# Patient Record
Sex: MALE | Race: WHITE | NOT HISPANIC OR LATINO | Employment: UNEMPLOYED | URBAN - NONMETROPOLITAN AREA
[De-identification: names, ages, dates, MRNs, and addresses within clinical notes are randomized per-mention and may not be internally consistent; named-entity substitution may affect disease eponyms.]

---

## 2017-01-09 DIAGNOSIS — J98.01 ACUTE BRONCHOSPASM: Primary | ICD-10-CM

## 2017-01-11 RX ORDER — ALBUTEROL SULFATE 90 UG/1
AEROSOL, METERED RESPIRATORY (INHALATION)
Qty: 18 G | Refills: 3 | Status: SHIPPED | OUTPATIENT
Start: 2017-01-11 | End: 2019-06-22

## 2017-01-17 ENCOUNTER — TELEPHONE (OUTPATIENT)
Dept: SURGERY | Facility: OTHER | Age: 42
End: 2017-01-17

## 2017-01-17 NOTE — TELEPHONE ENCOUNTER
Called and left voicemail for patient to return call to clinic to reschedule his appointment this morning due to Dr. Blanc being in the OR.     Zoe Aponte

## 2017-04-11 ENCOUNTER — TELEPHONE (OUTPATIENT)
Dept: FAMILY MEDICINE | Facility: OTHER | Age: 42
End: 2017-04-11

## 2017-04-11 NOTE — TELEPHONE ENCOUNTER
9:29 AM    Reason for Call: Phone Call    Description: Brijesh had a scope scheduled but they cancelled it. Can another appointment be scheduled ?  Please call Nena    Was an appointment offered for this call?  No    Preferred method for responding to this message: [phone]  Nena    If we cannot reach you directly, may we leave a detailed response at the number you provided?  Yes        Katlyn Tuttle

## 2017-04-11 NOTE — TELEPHONE ENCOUNTER
Called LM about reschedule appt/  available with Dr Blanc [facility] Monday 5/22/17 am or pm    Kim Harley

## 2017-04-17 DIAGNOSIS — K08.89 PAIN, DENTAL: ICD-10-CM

## 2017-04-19 RX ORDER — IBUPROFEN 800 MG/1
TABLET, FILM COATED ORAL
Qty: 90 TABLET | Refills: 5 | Status: SHIPPED | OUTPATIENT
Start: 2017-04-19 | End: 2023-05-02

## 2017-04-19 NOTE — TELEPHONE ENCOUNTER
Ibuprofen   Last Written Prescription Date: 12/13/16  Last Quantity: 90, # refills: 1  Last Office Visit with [facility], [facility] or [facility] prescribing provider: 11/28/16       Creatinine   Date Value Ref Range Status   12/22/2016 0.72 0.66 - 1.25 mg/dL Final     Lab Results   Component Value Date    AST 18 12/22/2016     Lab Results   Component Value Date    ALT 17 12/22/2016     BP Readings from Last 3 Encounters:   12/22/16 128/85   11/28/16 116/76   11/14/16 118/70

## 2018-02-01 ENCOUNTER — APPOINTMENT (OUTPATIENT)
Dept: OCCUPATIONAL MEDICINE | Facility: OTHER | Age: 43
End: 2018-02-01

## 2018-02-01 ENCOUNTER — APPOINTMENT (OUTPATIENT)
Dept: CHIROPRACTIC MEDICINE | Facility: OTHER | Age: 43
End: 2018-02-01

## 2018-02-01 PROCEDURE — 99499 UNLISTED E&M SERVICE: CPT

## 2018-02-18 ENCOUNTER — DOCUMENTATION ONLY (OUTPATIENT)
Dept: FAMILY MEDICINE | Facility: OTHER | Age: 43
End: 2018-02-18

## 2018-02-18 RX ORDER — ONDANSETRON 4 MG/1
1 TABLET, ORALLY DISINTEGRATING ORAL EVERY 6 HOURS PRN
COMMUNITY
Start: 2016-03-08 | End: 2021-08-17

## 2018-02-18 RX ORDER — HYDROCODONE BITARTRATE AND ACETAMINOPHEN 5; 325 MG/1; MG/1
1 TABLET ORAL EVERY 4 HOURS PRN
COMMUNITY
Start: 2016-03-07 | End: 2019-06-22

## 2018-02-18 RX ORDER — IBUPROFEN 200 MG
3 TABLET ORAL 4 TIMES DAILY PRN
COMMUNITY
Start: 2015-05-01 | End: 2021-08-17

## 2018-02-19 ENCOUNTER — DOCUMENTATION ONLY (OUTPATIENT)
Dept: FAMILY MEDICINE | Facility: OTHER | Age: 43
End: 2018-02-19

## 2018-02-19 RX ORDER — LISINOPRIL/HYDROCHLOROTHIAZIDE 10-12.5 MG
1 TABLET ORAL DAILY
COMMUNITY
Start: 2013-07-25 | End: 2019-06-22

## 2019-02-14 DIAGNOSIS — R11.0 NAUSEA: ICD-10-CM

## 2019-02-14 RX ORDER — ONDANSETRON 4 MG/1
4 TABLET, FILM COATED ORAL EVERY 8 HOURS PRN
Qty: 12 TABLET | Refills: 0 | OUTPATIENT
Start: 2019-02-14

## 2019-02-14 NOTE — TELEPHONE ENCOUNTER
Spoke with pharmacy and received new telephone number as a contact for this patient. Pharmacy was updated on patient needing to be seen prior to refill authorization. Writer then called number and left message for patient top return eriberto to clinic; notify patient of need to schedule an appointment upon return call.  Thank you.  Caterina Kaplan LPN

## 2019-02-14 NOTE — TELEPHONE ENCOUNTER
ondansetron (ZOFRAN) 4 MG tablet  Last Written Prescription Date:  12/13/16  Last Fill Quantity: 12,   # refills: 0  Last Office Visit: 12/28/16  Future Office visit:

## 2019-02-14 NOTE — TELEPHONE ENCOUNTER
Attempted to contact patient and person receiving call stated incorrect number.  Caterina Kaplan LPN

## 2019-03-25 ENCOUNTER — APPOINTMENT (OUTPATIENT)
Dept: OCCUPATIONAL MEDICINE | Facility: OTHER | Age: 44
End: 2019-03-25

## 2019-03-25 PROCEDURE — 99000 SPECIMEN HANDLING OFFICE-LAB: CPT

## 2019-06-22 ENCOUNTER — HOSPITAL ENCOUNTER (EMERGENCY)
Facility: HOSPITAL | Age: 44
Discharge: HOME | End: 2019-06-22
Attending: PHYSICIAN ASSISTANT | Admitting: PHYSICIAN ASSISTANT

## 2019-06-22 VITALS
SYSTOLIC BLOOD PRESSURE: 150 MMHG | TEMPERATURE: 97 F | RESPIRATION RATE: 14 BRPM | DIASTOLIC BLOOD PRESSURE: 99 MMHG | OXYGEN SATURATION: 99 %

## 2019-06-22 DIAGNOSIS — L03.90 CELLULITIS: ICD-10-CM

## 2019-06-22 PROCEDURE — 87070 CULTURE OTHR SPECIMN AEROBIC: CPT | Performed by: PHYSICIAN ASSISTANT

## 2019-06-22 PROCEDURE — G0463 HOSPITAL OUTPT CLINIC VISIT: HCPCS

## 2019-06-22 PROCEDURE — 99213 OFFICE O/P EST LOW 20 MIN: CPT | Mod: Z6 | Performed by: PHYSICIAN ASSISTANT

## 2019-06-22 PROCEDURE — 87205 SMEAR GRAM STAIN: CPT | Performed by: PHYSICIAN ASSISTANT

## 2019-06-22 RX ORDER — DOXYCYCLINE 100 MG/1
100 CAPSULE ORAL 2 TIMES DAILY
Qty: 20 CAPSULE | Refills: 0 | Status: SHIPPED | OUTPATIENT
Start: 2019-06-22 | End: 2019-07-02

## 2019-06-22 ASSESSMENT — ENCOUNTER SYMPTOMS
JOINT SWELLING: 0
CHILLS: 0
MYALGIAS: 0
ARTHRALGIAS: 0
FEVER: 0
WOUND: 1

## 2019-06-22 NOTE — ED PROVIDER NOTES
"  History     Chief Complaint   Patient presents with     Cellulitis     rt leg cellulitis, notes thinks he had exposure to poison destin     HPI  Brijesh Flynn is a 44 year old male who presents to urgent care today with complaints of right leg pain and rash.  He states onset about 1 week ago.  Patient tells me that he was mowing lawn and doing yard work, and noted a small area of rash and \"lump\" on the posterior right knee that evening.  Since that time, he has had increased redness and extending rash, with some associated tenderness, purulent drainage, and warmth.  He denies any fever, chills, sweats, joint pain, myalgias, fatigue or other concerns.  He did not notice any tick bites.  He has been applying topical Benadryl cream without relief.    Allergies:  Allergies   Allergen Reactions     Keflex [Cephalexin Hcl]        Problem List:    Patient Active Problem List    Diagnosis Date Noted     ACP (advance care planning) 11/28/2016     Priority: Medium     Advance Care Planning 11/28/2016: ACP Review of Chart / Resources Provided:  Reviewed chart for advance care plan.  Brijesh Flynn has no plan or code status on file. Discussed available resources and provided with information. Confirmed code status reflects current choices pending further ACP discussions.  Confirmed/documented legally designated decision makers.  Added by Trindiad Celeste             Essential hypertension 06/20/2013     Priority: Medium     Overview:   IMO Update          Past Medical History:    No past medical history on file.    Past Surgical History:    No past surgical history on file.    Family History:    Family History   Family history unknown: Yes       Social History:  Marital Status:  Single [1]  Social History     Tobacco Use     Smoking status: Current Every Day Smoker     Packs/day: 1.00     Smokeless tobacco: Never Used   Substance Use Topics     Alcohol use: No     Alcohol/week: 0.0 oz     Drug use: Not on file    "     Medications:      doxycycline hyclate (VIBRAMYCIN) 100 MG capsule   ibuprofen (ADVIL/MOTRIN) 200 MG tablet   ibuprofen (ADVIL/MOTRIN) 800 MG tablet   IBUPROFEN PO   ondansetron (ZOFRAN) 4 MG tablet   ondansetron (ZOFRAN-ODT) 4 MG ODT tab         Review of Systems   Constitutional: Negative for chills and fever.   Musculoskeletal: Negative for arthralgias, joint swelling and myalgias.   Skin: Positive for rash (Extending redness, posterior right thigh) and wound (Posterior right knee).       Physical Exam   BP: 150/99  Heart Rate: 95  Temp: 97  F (36.1  C)  Resp: 14  SpO2: 99 %      Physical Exam   Constitutional: He appears well-developed and well-nourished. No distress.   Pulmonary/Chest: Effort normal.   Skin:   Right lower extremity with significant erythema, induration and warmth noted on the posterior aspect of the upper thigh and popliteal fossa.  There is a open sore, that appears linear, with overlying crusting and clear drainage.  No fluctuance or purulent drainage.  Negative Homans sign.   Psychiatric: He has a normal mood and affect. His behavior is normal.       ED Course        Procedures              Critical Care time:               No results found for this or any previous visit (from the past 24 hour(s)).    Medications - No data to display    Assessments & Plan (with Medical Decision Making)   44-year-old male who presented to urgent care today with complaints of right lower extremity rash for 1 week that has been worsening.  He was unaware of any exposures to ticks; however, onset of symptoms was following yard work.  Physical examination revealed right lower extremity erythema, warmth and open sore on the posterior aspect of the upper thigh and popliteal fossa with some clear drainage and overlying crust.  There was no drainable abscess.  Culture was obtained and is pending.  Patient will be placed on doxycycline 100 mg twice daily for 10 days for cellulitis and to cover for tickborne  illness.  Education was given on side effects of medication including GI upset and sun sensitivity.  He was advised to follow-up with his primary provider in 1 week for wound recheck, and to extend antibiotics if needed.  Signs and symptoms of worsening were reviewed with patient including when to return.  He verbalized understanding and is in agreement with plan.      I have reviewed the nursing notes.    I have reviewed the findings, diagnosis, plan and need for follow up with the patient.         Medication List      Started    doxycycline hyclate 100 MG capsule  Commonly known as:  VIBRAMYCIN  100 mg, Oral, 2 TIMES DAILY            Final diagnoses:   Cellulitis       6/22/2019   [facility]     Aminta Mike PA-C  06/22/19 [id number]

## 2019-06-22 NOTE — ED TRIAGE NOTES
Pt presents with redness,warmth and pain to his right leg since last Sunday when he was weed whacking in shorts. Wound noted to back of his right knee with purulent drainage.

## 2019-06-22 NOTE — ED AVS SNAPSHOT
[facility]  [address] [address]  Phone:  [phone]                                    Brijesh Flynn   MRN: [id number]    Department:  [facility]   Date of Visit:  6/22/2019           After Visit Summary Signature Page    I have received my discharge instructions, and my questions have been answered. I have discussed any challenges I see with this plan with the nurse or doctor.    ..........................................................................................................................................  Patient/Patient Representative Signature      ..........................................................................................................................................  Patient Representative Print Name and Relationship to Patient    ..................................................               ................................................  Date                                   Time    ..........................................................................................................................................  Reviewed by Signature/Title    ...................................................              ..............................................  Date                                               Time          22EPIC Rev 08/18

## 2019-06-22 NOTE — DISCHARGE INSTRUCTIONS
Take all antibiotics, unless we call you with culture results to change treatment.  Apply topical bacitracin and keep covered with bandaid to prevent further infection until open area is healed.  Ice, Tylenol/Ibuprofen for comfort.  Return with any worsening symptoms or new concerns.  Follow up in clinic with your provider in 1 week for recheck.

## 2019-06-23 LAB
GRAM STN SPEC: ABNORMAL
SPECIMEN SOURCE: ABNORMAL

## 2019-06-24 LAB
BACTERIA SPEC CULT: NORMAL
SPECIMEN SOURCE: NORMAL

## 2021-08-17 ENCOUNTER — APPOINTMENT (OUTPATIENT)
Dept: ULTRASOUND IMAGING | Facility: HOSPITAL | Age: 46
End: 2021-08-17
Attending: NURSE PRACTITIONER

## 2021-08-17 ENCOUNTER — HOSPITAL ENCOUNTER (EMERGENCY)
Facility: HOSPITAL | Age: 46
Discharge: HOME | End: 2021-08-17
Attending: STUDENT IN AN ORGANIZED HEALTH CARE EDUCATION/TRAINING PROGRAM | Admitting: STUDENT IN AN ORGANIZED HEALTH CARE EDUCATION/TRAINING PROGRAM

## 2021-08-17 VITALS
SYSTOLIC BLOOD PRESSURE: 159 MMHG | OXYGEN SATURATION: 98 % | DIASTOLIC BLOOD PRESSURE: 107 MMHG | TEMPERATURE: 98 F | RESPIRATION RATE: 16 BRPM | HEART RATE: 72 BPM

## 2021-08-17 DIAGNOSIS — L03.115 CELLULITIS OF RIGHT LOWER EXTREMITY: ICD-10-CM

## 2021-08-17 LAB
HOLD SPECIMEN: NORMAL

## 2021-08-17 PROCEDURE — 99284 EMERGENCY DEPT VISIT MOD MDM: CPT | Mod: 25

## 2021-08-17 PROCEDURE — 99284 EMERGENCY DEPT VISIT MOD MDM: CPT | Performed by: STUDENT IN AN ORGANIZED HEALTH CARE EDUCATION/TRAINING PROGRAM

## 2021-08-17 PROCEDURE — 93971 EXTREMITY STUDY: CPT | Mod: RT

## 2021-08-17 RX ORDER — CLINDAMYCIN HCL 150 MG
300 CAPSULE ORAL 4 TIMES DAILY
Qty: 80 CAPSULE | Refills: 0 | Status: SHIPPED | OUTPATIENT
Start: 2021-08-17 | End: 2021-08-27

## 2021-08-17 NOTE — ED NOTES
Pt states he has had bilateral leg swelling on and off for the last year. States last week he was cutting wood and a log hit him on his right leg below the knee proxomal side. Pt has bruise in that area. States right leg swelling worse since then. C/O some tightness feeling. No pain on dorsa flexion of right foot.

## 2021-08-18 NOTE — ED NOTES
Ultrasound in progress. Pt anxious and reports he is ready to leave. Agreeable to having the ultrasound at this time.

## 2021-08-18 NOTE — DISCHARGE INSTRUCTIONS
Please take the clindamycin that I have prescribed for you 4 times a day for the next 10 days.  Follow-up with your primary care provider within the next week.  Call to schedule an appointment.  Return to the emergency department if you have worsening of your symptoms or any new concerning symptoms.  For pain you can use ibuprofen and Tylenol going forward.  Most people see improvement in their symptoms within 24 to 48 hours, if you do not see improvement in your symptoms within 48 to 72 hours you can return to the emergency department for reevaluation as you may be needing a different antibiotic.

## 2021-08-18 NOTE — ED PROVIDER NOTES
History     Chief Complaint   Patient presents with     Leg Swelling     HPI  Brijesh Flynn is a 46 year old male with RLE swelling and redness with pain and heat for the last week. He states that he's had swelling on and off in the RLE for the last year. He thinks it started this week after a wood chip struck his leg.   Denies fevers, numbness, tingling, weakness, nausea, vomiting, cother complaints. Denies previous hx of clots.    Allergies:  Allergies   Allergen Reactions     Keflex [Cephalexin Hcl]        Problem List:    Patient Active Problem List    Diagnosis Date Noted     ACP (advance care planning) 11/28/2016     Priority: Medium     Advance Care Planning 11/28/2016: ACP Review of Chart / Resources Provided:  Reviewed chart for advance care plan.  Brijesh Flynn has no plan or code status on file. Discussed available resources and provided with information. Confirmed code status reflects current choices pending further ACP discussions.  Confirmed/documented legally designated decision makers.  Added by Trinidad Celeste             Essential hypertension 06/20/2013     Priority: Medium     Overview:   IMO Update          Past Medical History:    No past medical history on file.    Past Surgical History:    No past surgical history on file.    Family History:    Family History   Family history unknown: Yes       Social History:  Marital Status:  Single [1]  Social History     Tobacco Use     Smoking status: Current Every Day Smoker     Packs/day: 1.00     Smokeless tobacco: Never Used   Substance Use Topics     Alcohol use: No     Alcohol/week: 0.0 standard drinks     Drug use: Not on file        Medications:    clindamycin (CLEOCIN) 150 MG capsule  ibuprofen (ADVIL/MOTRIN) 800 MG tablet          Review of Systems  A complete review of systems was performed and is otherwise negative.     Physical Exam   BP: 132/91  Pulse: 87  Temp: 98.7  F (37.1  C)  Resp: 16  SpO2: 98 %      Physical  Exam  Constitutional: Alert and conversant. NAD   HENT: NCAT   Eyes: Normal pupils   Neck: supple   CV: Normal rate, regular rhythm, no murmur   Pulmonary/Chest: Non-labored respirations, clear to auscultation bilaterally   Abdominal: Soft, non-tender, non-distended   MSK: MTZ. Redness, swelling and heat in RLE, no obvious areas of induration, too much sweling to palpate pulses  Neuro: Alert and appropriate   Skin: Warm and dry. No diaphoresis. No rashes on exposed skin    Psych: Appropriate mood and affect     ED Course     ED Course as of Aug 17 2105   Tue Aug 17, 2021   2036 46-year-old male male with no relevant past medical history here with swelling, redness in the right lower extremity.  Primary differential concerning for cellulitis versus DVT.  There is substantial swelling, and he notes that the swelling existed prior to the redness and warmth.  We will plan for right lower extremity DVT ultrasound to rule out clot.  Plan for antibiotics after if negative.      2101 No DVT, will discharge with antibiotics, primary care follow-up in a week.   US Lower Extremity Venous Duplex Right   2104 Patient updated on the findings, plan for discharge      2105 Patient discharged in stable condition with all questions answered return precautions given, primary care follow-up within 1 week.        Procedures         Results for orders placed or performed during the hospital encounter of 08/17/21 (from the past 24 hour(s))   [place] Draw    Narrative    The following orders were created for panel order [place] Draw.  Procedure                               Abnormality         Status                     ---------                               -----------         ------                     Extra Blue Top Tube[[id number]]                              Final result               Extra Red Top Tube[[id number]]                               Final result               Extra Green Top (Lithium...[[id number]]                       Final result               Extra Green Top (Lithium...[[id number]]                      Final result               Extra Purple Top Tube[[id number]]                            Final result                 Please view results for these tests on the individual orders.   Extra Blue Top Tube   Result Value Ref Range    Hold Specimen JIC    Extra Red Top Tube   Result Value Ref Range    Hold Specimen JIC    Extra Green Top (Lithium Heparin) Tube   Result Value Ref Range    Hold Specimen JIC    Extra Green Top (Lithium Heparin) Tube   Result Value Ref Range    Hold Specimen JIC    Extra Purple Top Tube   Result Value Ref Range    Hold Specimen JIC    US Lower Extremity Venous Duplex Right    Narrative    Exam:US LOWER EXTREMITY VENOUS DUPLEX RIGHT    History: Right leg pain    Comparisons: None    Technique: Venous duplex ultrasonography of the lower extremity was  performed.     Findings: The common femoral vein, superficial femoral vein and  popliteal vein are fully compressible with spontaneous and augmentable  venous flow.           Impression    Impression: No evidence of deep venous thrombosis within the right  lower extremity.    GWENDOLYN NARANJO MD         SYSTEM ID:  [id number]       Medications - No data to display    Assessments & Plan (with Medical Decision Making)     I have reviewed the nursing notes.    I have reviewed the findings, diagnosis, plan and need for follow up with the patient.    New Prescriptions    CLINDAMYCIN (CLEOCIN) 150 MG CAPSULE    Take 2 capsules (300 mg) by mouth 4 times daily for 10 days       Final diagnoses:   Cellulitis of right lower extremity       8/17/2021   [facility]     Isrrael Mondragon MD  08/17/21 [id number]

## 2022-03-17 ENCOUNTER — APPOINTMENT (OUTPATIENT)
Dept: GENERAL RADIOLOGY | Facility: HOSPITAL | Age: 47
End: 2022-03-17
Attending: EMERGENCY MEDICINE

## 2022-03-17 ENCOUNTER — HOSPITAL ENCOUNTER (EMERGENCY)
Facility: HOSPITAL | Age: 47
Discharge: HOME | End: 2022-03-17
Attending: EMERGENCY MEDICINE | Admitting: EMERGENCY MEDICINE

## 2022-03-17 VITALS
RESPIRATION RATE: 20 BRPM | TEMPERATURE: 97 F | WEIGHT: 165 LBS | SYSTOLIC BLOOD PRESSURE: 144 MMHG | BODY MASS INDEX: 25.01 KG/M2 | HEART RATE: 84 BPM | HEIGHT: 68 IN | OXYGEN SATURATION: 99 % | DIASTOLIC BLOOD PRESSURE: 110 MMHG

## 2022-03-17 DIAGNOSIS — S61.412A LACERATION OF LEFT HAND WITHOUT FOREIGN BODY, INITIAL ENCOUNTER: ICD-10-CM

## 2022-03-17 PROCEDURE — 12042 INTMD RPR N-HF/GENIT2.6-7.5: CPT | Performed by: EMERGENCY MEDICINE

## 2022-03-17 PROCEDURE — 90471 IMMUNIZATION ADMIN: CPT | Performed by: EMERGENCY MEDICINE

## 2022-03-17 PROCEDURE — 99284 EMERGENCY DEPT VISIT MOD MDM: CPT | Mod: 25

## 2022-03-17 PROCEDURE — 250N000011 HC RX IP 250 OP 636: Performed by: EMERGENCY MEDICINE

## 2022-03-17 PROCEDURE — 73130 X-RAY EXAM OF HAND: CPT | Mod: LT

## 2022-03-17 PROCEDURE — 90715 TDAP VACCINE 7 YRS/> IM: CPT | Performed by: EMERGENCY MEDICINE

## 2022-03-17 PROCEDURE — 250N000013 HC RX MED GY IP 250 OP 250 PS 637: Performed by: EMERGENCY MEDICINE

## 2022-03-17 PROCEDURE — 96374 THER/PROPH/DIAG INJ IV PUSH: CPT | Mod: XU

## 2022-03-17 PROCEDURE — 12042 INTMD RPR N-HF/GENIT2.6-7.5: CPT

## 2022-03-17 RX ORDER — OXYCODONE AND ACETAMINOPHEN 5; 325 MG/1; MG/1
1 TABLET ORAL ONCE
Status: COMPLETED | OUTPATIENT
Start: 2022-03-17 | End: 2022-03-17

## 2022-03-17 RX ADMIN — HYDROMORPHONE HYDROCHLORIDE 1 MG: 1 INJECTION, SOLUTION INTRAMUSCULAR; INTRAVENOUS; SUBCUTANEOUS at 02:26

## 2022-03-17 RX ADMIN — OXYCODONE HYDROCHLORIDE AND ACETAMINOPHEN 1 TABLET: 5; 325 TABLET ORAL at 03:42

## 2022-03-17 RX ADMIN — CLOSTRIDIUM TETANI TOXOID ANTIGEN (FORMALDEHYDE INACTIVATED), CORYNEBACTERIUM DIPHTHERIAE TOXOID ANTIGEN (FORMALDEHYDE INACTIVATED), BORDETELLA PERTUSSIS TOXOID ANTIGEN (GLUTARALDEHYDE INACTIVATED), BORDETELLA PERTUSSIS FILAMENTOUS HEMAGGLUTININ ANTIGEN (FORMALDEHYDE INACTIVATED), BORDETELLA PERTUSSIS PERTACTIN ANTIGEN, AND BORDETELLA PERTUSSIS FIMBRIAE 2/3 ANTIGEN 0.5 ML: 5; 2; 2.5; 5; 3; 5 INJECTION, SUSPENSION INTRAMUSCULAR at 02:19

## 2022-03-17 ASSESSMENT — ENCOUNTER SYMPTOMS
FEVER: 0
CHILLS: 0
SHORTNESS OF BREATH: 0

## 2022-03-17 NOTE — ED TRIAGE NOTES
Pt in for evaluation of left hand injury. Reports he was cutting wood with an axe and the tool jumped off the log and hit him on the hand. Unknown tetanus status. Provider in to see pt on arrival to room.

## 2022-03-17 NOTE — ED PROVIDER NOTES
History     Chief Complaint   Patient presents with     Hand Injury     HPI  Brijesh Flynn is a 47 year old male who is here with laceration to left hand after striking it accidentally with an ax.  He was working, went to chop firewood, sustained laceration then.  Unsure when last tetanus was.  Severe pain.  Able to move hand and wrist albeit without I mean with significant pain.  No other injuries.  Has been putting compression on it as it was bleeding a lot, states it was squirting.    Allergies:  Allergies   Allergen Reactions     Keflex [Cephalexin Hcl]        Problem List:    Patient Active Problem List    Diagnosis Date Noted     ACP (advance care planning) 11/28/2016     Priority: Medium     Advance Care Planning 11/28/2016: ACP Review of Chart / Resources Provided:  Reviewed chart for advance care plan.  Brijesh Flynn has no plan or code status on file. Discussed available resources and provided with information. Confirmed code status reflects current choices pending further ACP discussions.  Confirmed/documented legally designated decision makers.  Added by Trindiad Celeste             Essential hypertension 06/20/2013     Priority: Medium     Overview:   IMO Update          Past Medical History:    History reviewed. No pertinent past medical history.    Past Surgical History:    History reviewed. No pertinent surgical history.    Family History:    Family History   Family history unknown: Yes       Social History:  Marital Status:  Single [1]  Social History     Tobacco Use     Smoking status: Current Every Day Smoker     Packs/day: 0.50     Smokeless tobacco: Never Used   Substance Use Topics     Alcohol use: No     Alcohol/week: 0.0 standard drinks     Drug use: Not Currently        Medications:    ibuprofen (ADVIL/MOTRIN) 800 MG tablet          Review of Systems   Constitutional: Negative for chills and fever.   Respiratory: Negative for shortness of breath.    Cardiovascular: Negative for chest  "pain.   All other systems reviewed and are negative.      Physical Exam   BP: (!) 162/107  Pulse: 99  Temp: 97  F (36.1  C)  Resp: 20  Height: 172.7 cm (5' 8\")  Weight: 74.8 kg (165 lb)  SpO2: 96 %      Physical Exam  Constitutional:       General: He is not in acute distress.     Appearance: Normal appearance.   HENT:      Head: Normocephalic and atraumatic.      Right Ear: External ear normal.      Left Ear: External ear normal.      Nose: Nose normal. No rhinorrhea.   Eyes:      Conjunctiva/sclera: Conjunctivae normal.   Cardiovascular:      Rate and Rhythm: Normal rate and regular rhythm.      Pulses: Normal pulses.   Pulmonary:      Effort: Pulmonary effort is normal. No respiratory distress.      Breath sounds: Normal breath sounds.   Abdominal:      General: There is no distension.      Palpations: Abdomen is soft.      Tenderness: There is no abdominal tenderness.   Musculoskeletal:         General: No deformity.      Comments: 4 cm linear laceration to the left hand, posterior surface, between first and second digit, down to muscle no tendon involvement   Skin:     General: Skin is warm and dry.      Capillary Refill: Capillary refill takes less than 2 seconds.   Neurological:      General: No focal deficit present.      Mental Status: He is alert. Mental status is at baseline.   Psychiatric:         Mood and Affect: Mood normal.         Behavior: Behavior normal.         ED Course                 [facility]    -Laceration Repair    Date/Time: 3/17/2022 7:04 AM  Performed by: Cricket Gore MD  Authorized by: Cricket Gore MD     Risks, benefits and alternatives discussed.      ANESTHESIA (see MAR for exact dosages):     Anesthesia method:  Local infiltration    Local anesthetic:  Lidocaine 2% WITH epi  LACERATION DETAILS     Location:  Hand    Hand location:  L hand, dorsum    Length (cm):  4    Depth (mm):  3    REPAIR TYPE:     Repair type:  Simple      EXPLORATION:     Hemostasis achieved " with:  Epinephrine and direct pressure    Wound exploration: wound explored through full range of motion and entire depth of wound probed and visualized      Wound extent: areolar tissue violated      Contaminated: yes      TREATMENT:     Area cleansed with:  Saline    Amount of cleaning:  Extensive    Irrigation solution:  Sterile saline    Irrigation volume:  1000    Irrigation method:  Pressure wash    Visualized foreign bodies/material removed: yes      SKIN REPAIR     Repair method:  Sutures    Suture size:  3-0    Suture material:  Nylon    Suture technique:  Simple interrupted    Number of sutures:  7    APPROXIMATION     Approximation:  Close    POST-PROCEDURE DETAILS     Dressing:  Non-adherent dressing        PROCEDURE    Patient Tolerance:  Patient tolerated the procedure well with no immediate complications               Critical Care time:               No results found for this or any previous visit (from the past 24 hour(s)).    Medications   HYDROmorphone (DILAUDID) injection 1 mg (1 mg Intravenous Given 3/17/22 0226)   Tdap (tetanus-diphtheria-acell pertussis) (ADACEL) injection 0.5 mL (0.5 mLs Intramuscular Given 3/17/22 0219)   oxyCODONE-acetaminophen (PERCOCET) 5-325 MG per tablet 1 tablet (1 tablet Oral Given 3/17/22 0342)       Assessments & Plan (with Medical Decision Making)     I have reviewed the nursing notes.    I have reviewed the findings, diagnosis, plan and need for follow up with the patient.      Discharge Medication List as of 3/17/2022  4:31 AM          Final diagnoses:   Laceration of left hand without foreign body, initial encounter       3/17/2022   [facility]     Cricket Gore MD  03/17/22 [id number]

## 2022-07-30 ENCOUNTER — HOSPITAL ENCOUNTER (EMERGENCY)
Facility: HOSPITAL | Age: 47
Discharge: HOME | End: 2022-07-30
Attending: EMERGENCY MEDICINE | Admitting: EMERGENCY MEDICINE

## 2022-07-30 ENCOUNTER — APPOINTMENT (OUTPATIENT)
Dept: GENERAL RADIOLOGY | Facility: HOSPITAL | Age: 47
End: 2022-07-30
Attending: EMERGENCY MEDICINE

## 2022-07-30 VITALS
RESPIRATION RATE: 16 BRPM | HEART RATE: 79 BPM | HEIGHT: 68 IN | OXYGEN SATURATION: 97 % | BODY MASS INDEX: 25.01 KG/M2 | WEIGHT: 165 LBS | TEMPERATURE: 98.5 F | DIASTOLIC BLOOD PRESSURE: 98 MMHG | SYSTOLIC BLOOD PRESSURE: 148 MMHG

## 2022-07-30 DIAGNOSIS — S51.811A LACERATION OF RIGHT FOREARM, INITIAL ENCOUNTER: ICD-10-CM

## 2022-07-30 PROCEDURE — 96374 THER/PROPH/DIAG INJ IV PUSH: CPT | Mod: XU

## 2022-07-30 PROCEDURE — 12032 INTMD RPR S/A/T/EXT 2.6-7.5: CPT

## 2022-07-30 PROCEDURE — 12032 INTMD RPR S/A/T/EXT 2.6-7.5: CPT | Performed by: EMERGENCY MEDICINE

## 2022-07-30 PROCEDURE — 73090 X-RAY EXAM OF FOREARM: CPT | Mod: RT

## 2022-07-30 PROCEDURE — 250N000011 HC RX IP 250 OP 636: Performed by: EMERGENCY MEDICINE

## 2022-07-30 PROCEDURE — 99284 EMERGENCY DEPT VISIT MOD MDM: CPT | Mod: 25

## 2022-07-30 RX ORDER — FENTANYL CITRATE 50 UG/ML
50 INJECTION, SOLUTION INTRAMUSCULAR; INTRAVENOUS ONCE
Status: COMPLETED | OUTPATIENT
Start: 2022-07-30 | End: 2022-07-30

## 2022-07-30 RX ADMIN — FENTANYL CITRATE 50 MCG: 50 INJECTION, SOLUTION INTRAMUSCULAR; INTRAVENOUS at 16:10

## 2022-07-30 RX ADMIN — FENTANYL CITRATE 50 MCG: 50 INJECTION, SOLUTION INTRAMUSCULAR; INTRAVENOUS at 16:16

## 2022-07-30 NOTE — ED TRIAGE NOTES
Pt arrives via EMS after cut self with an angle  type tool malfunction to RT forearm., pt arrives with tourniquet in place by EMS. tourniquet placed at 1534. Bleeding controlled at this time. Pt is alert, diaphoretic. Pain 8/10.      Triage Assessment     Row Name 07/30/22 7548       Triage Assessment (Adult)    Airway WDL WDL       Respiratory WDL    Respiratory WDL WDL       Skin Circulation/Temperature WDL    Skin Circulation/Temperature WDL X;all    Skin Circulation no pallor    Skin Temperature --  DIAPHORETIC       Cardiac WDL    Cardiac WDL WDL       Peripheral/Neurovascular WDL    Peripheral Neurovascular WDL WDL;neurovascular assessment upper;pulse assessment    Pulse Assessment radial       RUE Neurovascular Assessment    Temperature RUE warm       Pulse Radial    Right Radial Pulse --   tournqet in place

## 2022-07-30 NOTE — LETTER
Brijesh Flynn was seen and treated in our emergency department on 7/30/2022.  He should be on light duty until August 14, 2022.    If you have any questions or concerns, please don't hesitate to call.              Micha Aragon PA-C

## 2022-07-30 NOTE — DISCHARGE INSTRUCTIONS
You were seen in the emergency department at [facility] for a deep laceration of your right forearm.  Your laceration was washed out and repaired here.  There are 15 external stitches which will need to be removed.  We would recommend taking these out in 14 days because there is a lot of tension on the wound.  In the meantime for the next 2 weeks we would advise avoiding any weightbearing more than 5 pounds with the right elbow and forearm.  Please monitor for infection.  If you notice a lot of redness or drainage spreading from the area we should reevaluate you in the ER.

## 2022-07-30 NOTE — ED PROVIDER NOTES
EMERGENCY DEPARTMENT ENCOUNTER      NAME: Brijesh Flynn  AGE: 47 year old male  YOB: 1975  MRN: [id number]  EVALUATION DATE & TIME: 2022  4:04 PM    PCP: No Ref-Primary, Physician    ED PROVIDER: Ricardo Munson M.D.      Chief Complaint   Patient presents with     Laceration     WITH ANGLE          FINAL IMPRESSION:  1. Laceration of right forearm, initial encounter          ED COURSE & MEDICAL DECISION MAKIN year old male presents to the Emergency Department for evaluation of R forearm laceration. Patient seen immediately on arrival. Fairly deep laceration to R dorsal forearm.  Large-bore IV access was obtained.  Patient was started on crystalloid bolus and given IV fentanyl.  Wound was anesthetized and irrigated.  Tourniquet was released with only a mild amount of venous bleeding following this which was easily controllable with pressure.  Laceration was further irrigated and anesthetized and closed with both deep and superficial sutures.  There is no evidence of arterial injury, bony injury, severe tendon disruption.  There is some disruption of the brachioradialis musculature in that region however this appears to be relatively incomplete.  He has normal function of his distal forearm.  We discussed wound care plan.  His tetanus is up-to-date.  He was discharged to follow-up in 14 days for wound check and suture removal.    At the conclusion of the encounter I discussed the results of all of the tests and the disposition. The questions were answered. The patient or family acknowledged understanding and was agreeable with the care plan.       MEDICATIONS GIVEN IN THE EMERGENCY:  Medications   fentaNYL (PF) (SUBLIMAZE) injection 50 mcg (50 mcg Intravenous Given 22 1610)   fentaNYL (PF) (SUBLIMAZE) injection 50 mcg (50 mcg Intravenous Given 22 1616)       NEW PRESCRIPTIONS STARTED AT TODAY'S ER VISIT  New Prescriptions    No medications on file       "    =================================================================    Lists of hospitals in the [place]    Patient information was obtained from: Patient      Brijesh Flynn is a 47 year old male who presents to this ED today for evaluation of laceration. He was working with an angle , plugged it in and it started up unexpectedly, lacerating his R forearm. He had his wife drive him to the fire station. There was brisk bleeding EMS couldn't control with pressure so a tourniquet was applied. He reports pain isolated to the area of the laceration. He denies any weakness of numbness of his distal forearm or hand.      REVIEW OF SYSTEMS   All systems reviewed and negative except as noted in HPI.    PAST MEDICAL HISTORY:  No past medical history on file.    PAST SURGICAL HISTORY:  No past surgical history on file.        CURRENT MEDICATIONS:    No current facility-administered medications for this encounter.     Current Outpatient Medications   Medication     ibuprofen (ADVIL/MOTRIN) 800 MG tablet         ALLERGIES:  Allergies   Allergen Reactions     Keflex [Cephalexin Hcl]        FAMILY HISTORY:  Family History   Family history unknown: Yes       SOCIAL HISTORY:   Social History     Socioeconomic History     Marital status: Single   Tobacco Use     Smoking status: Current Every Day Smoker     Packs/day: 0.50     Smokeless tobacco: Never Used   Substance and Sexual Activity     Alcohol use: No     Alcohol/week: 0.0 standard drinks     Drug use: Not Currently       VITALS:  BP (!) 153/107   Pulse 96   Temp 98.5  F (36.9  C)   Resp 16   Ht 1.727 m (5' 8\")   Wt 74.8 kg (165 lb)   SpO2 99%   BMI 25.09 kg/m      PHYSICAL EXAM    Constitutional: Anxious middle-age male patient, mildly diaphoretic, laying in bed  HENT: Normocephalic, Atraumatic. Neck Supple.  Eyes: EOMI, Conjunctiva normal.  Respiratory: Breathing comfortably on room air. Speaks full sentences easily. Lungs clear to ascultation.  Cardiovascular: Normal heart rate, " Regular rhythm. No peripheral edema.  Abdomen: Soft  Musculoskeletal: Arterial tourniquet is in place on the right upper arm upon arrival.  There is a deep laceration to the dorsal right forearm just distal to the elbow.  There is exposed muscle belly from the brachial radialis and some extensor muscles.  Patient is able to flex and extend at the wrist and  strength is normal.  Once tourniquet is removed there is minimal bleeding.  Integument: Warm, Dry.  Neurologic: Alert & awake, Normal motor function, Normal sensory function, No focal deficits noted.  Sensation and strength are intact throughout distal right arm  Psychiatric: Cooperative. Affect appropriate.       PROCEDURES:   [facility]    -Laceration Repair    Date/Time: 7/30/2022 4:48 PM  Performed by: Ricardo Munson MD  Authorized by: Ricardo Munson MD     Emergent condition/consent implied      ANESTHESIA (see MAR for exact dosages):     Anesthesia method:  Local infiltration    Local anesthetic:  Lidocaine 1% WITH epi  LACERATION DETAILS     Location:  Shoulder/arm    Shoulder/arm location:  R lower arm    Length (cm):  6    Depth (mm):  15    REPAIR TYPE:     Repair type:  Intermediate      EXPLORATION:     Hemostasis achieved with:  Epinephrine and direct pressure    Wound exploration: wound explored through full range of motion and entire depth of wound probed and visualized      Wound extent: muscle damage      Wound extent: no nerve damage, no tendon damage, no underlying fracture and no vascular damage      Contaminated: no      TREATMENT:     Area cleansed with:  Saline    Amount of cleaning:  Standard    Irrigation solution:  Sterile saline    Irrigation method:  Syringe    Visualized foreign bodies/material removed: no      SUBCUTANEOUS REPAIR     Suture size:  3-0    Suture material:  Vicryl    Number of sutures:  5    SKIN REPAIR     Repair method:  Sutures    Suture size:  3-0    Suture material:  Nylon    Suture  technique:  Simple interrupted    Number of sutures:  15    APPROXIMATION     Approximation:  Close    POST-PROCEDURE DETAILS     Dressing:  Non-adherent dressing        PROCEDURE    Patient Tolerance:  Patient tolerated the procedure well with no immediate complications              Ricardo Munson M.D.  Emergency Medicine  [facility]  [address] [place] [id number] [phone]  Dept: [phone]       Ricardo Munson MD  07/30/22 6895

## 2022-08-14 ENCOUNTER — TELEPHONE (OUTPATIENT)
Dept: NURSING | Facility: CLINIC | Age: 47
End: 2022-08-14

## 2022-08-14 NOTE — TELEPHONE ENCOUNTER
Patient's SO, Nena calling (verbal consent to communicate given by patient). Wondering where pt can get a return to work note.    Patient was seen in [place] ED on 7/30 and required stitches in his R arm due to a laceration. He does not have a  PCP. Needs to be able to return back to work tomorrow.    Advised pt that he could make an appt with a [facility] provider to establish care, or he could try the Urgent Care in [place]. Patient's SO verbalized understanding.     Zoë Lopez, RN, BSN  [facility]   Triage Nurse Advisor

## 2023-05-02 ENCOUNTER — OFFICE VISIT (OUTPATIENT)
Dept: FAMILY MEDICINE | Facility: OTHER | Age: 48
End: 2023-05-02
Attending: NURSE PRACTITIONER
Payer: COMMERCIAL

## 2023-05-02 VITALS
OXYGEN SATURATION: 96 % | RESPIRATION RATE: 18 BRPM | HEART RATE: 102 BPM | BODY MASS INDEX: 26.97 KG/M2 | WEIGHT: 177.4 LBS | DIASTOLIC BLOOD PRESSURE: 92 MMHG | SYSTOLIC BLOOD PRESSURE: 148 MMHG | TEMPERATURE: 98.4 F

## 2023-05-02 DIAGNOSIS — Z20.2 EXPOSURE TO SEXUALLY TRANSMITTED DISEASE (STD): Primary | ICD-10-CM

## 2023-05-02 PROCEDURE — 86780 TREPONEMA PALLIDUM: CPT | Performed by: NURSE PRACTITIONER

## 2023-05-02 PROCEDURE — 99213 OFFICE O/P EST LOW 20 MIN: CPT | Performed by: NURSE PRACTITIONER

## 2023-05-02 PROCEDURE — 86803 HEPATITIS C AB TEST: CPT | Performed by: NURSE PRACTITIONER

## 2023-05-02 PROCEDURE — 87491 CHLMYD TRACH DNA AMP PROBE: CPT | Performed by: NURSE PRACTITIONER

## 2023-05-02 PROCEDURE — 36415 COLL VENOUS BLD VENIPUNCTURE: CPT | Performed by: NURSE PRACTITIONER

## 2023-05-02 PROCEDURE — 87591 N.GONORRHOEAE DNA AMP PROB: CPT | Performed by: NURSE PRACTITIONER

## 2023-05-02 PROCEDURE — 87389 HIV-1 AG W/HIV-1&-2 AB AG IA: CPT | Performed by: NURSE PRACTITIONER

## 2023-05-02 PROCEDURE — 86695 HERPES SIMPLEX TYPE 1 TEST: CPT | Performed by: NURSE PRACTITIONER

## 2023-05-02 PROCEDURE — 87661 TRICHOMONAS VAGINALIS AMPLIF: CPT | Performed by: NURSE PRACTITIONER

## 2023-05-02 PROCEDURE — 86696 HERPES SIMPLEX TYPE 2 TEST: CPT | Performed by: NURSE PRACTITIONER

## 2023-05-02 ASSESSMENT — PAIN SCALES - GENERAL: PAINLEVEL: NO PAIN (0)

## 2023-05-02 NOTE — PROGRESS NOTES
Assessment & Plan     Brijesh presents to clinic today for concern for STD/I. His girlfriend of 10 years recently tested positive for Trichomonas and Herpes. He reports that he has never had an STD. He denies any urinary, penile, or scrotal concerns.     (Z20.2) Exposure to sexually transmitted disease (STD)  (primary encounter diagnosis)  Comment: check STD/I labs and urine   Plan: Trichomonas vaginalis by PCR, GC/Chlamydia by         PCR - [facility], HIV Antigen Antibody Combo,         Hepatitis C Screen Reflex to HCV RNA Quant and         Genotype, Herpes Simplex Virus 1 and 2 IgG,         Treponema Abs w Reflex to RPR and Titer               Nicotine/Tobacco Cessation:  He reports that he has been smoking. He has been smoking an average of .5 packs per day. He has never used smokeless tobacco.  Nicotine/Tobacco Cessation Plan:   Information offered: Patient not interested at this time      See Patient Instructions    Return if symptoms worsen or fail to improve.    OMAR Ross CNP  [facility]   Brijesh is a 48 year old, presenting for the following health issues:  STD      HPI     Concern - STD Check   Onset: Unknown as he has no symptoms. Significant other recently tested positive for Trich and Herpes    Description: Trich, Herpes   Reporting no s/sx - expressing exposure       Review of Systems   Constitutional, HEENT, cardiovascular, pulmonary, gi and gu systems are negative, except as otherwise noted.      Objective    BP (!) 148/92   Pulse 102   Temp 98.4  F (36.9  C)   Resp 18   Wt 80.5 kg (177 lb 6.4 oz)   SpO2 96%   BMI 26.97 kg/m    Body mass index is 26.97 kg/m .  Physical Exam   GENERAL: healthy, alert and no distress  NECK: no adenopathy, no asymmetry, masses, or scars and thyroid normal to palpation  RESP: lungs clear to auscultation - no rales, rhonchi or wheezes  CV: regular rate and rhythm, normal S1 S2, no S3 or S4, no murmur, click or rub, no  peripheral edema and peripheral pulses strong   (male): declined exam   MS: no gross musculoskeletal defects noted, no edema  SKIN: no suspicious lesions or rashes  PSYCH: mentation appears normal, affect normal/bright

## 2023-05-03 LAB
C TRACH DNA SPEC QL PROBE+SIG AMP: NEGATIVE
HCV AB SERPL QL IA: NONREACTIVE
HIV 1+2 AB+HIV1 P24 AG SERPL QL IA: NONREACTIVE
N GONORRHOEA DNA SPEC QL NAA+PROBE: NEGATIVE
T VAGINALIS DNA SPEC QL NAA+PROBE: NOT DETECTED

## 2023-05-04 LAB — T PALLIDUM AB SER QL: NONREACTIVE

## 2023-05-08 LAB
HSV1 IGG SERPL QL IA: 21.5 INDEX
HSV1 IGG SERPL QL IA: ABNORMAL
HSV2 IGG SERPL QL IA: 0.13 INDEX
HSV2 IGG SERPL QL IA: ABNORMAL

## 2023-06-03 ENCOUNTER — HEALTH MAINTENANCE LETTER (OUTPATIENT)
Age: 48
End: 2023-06-03

## 2023-06-15 ENCOUNTER — APPOINTMENT (OUTPATIENT)
Dept: OCCUPATIONAL MEDICINE | Facility: OTHER | Age: 48
End: 2023-06-15

## 2023-06-15 PROCEDURE — 99080 SPECIAL REPORTS OR FORMS: CPT

## 2023-06-15 PROCEDURE — 94010 BREATHING CAPACITY TEST: CPT

## 2023-06-15 PROCEDURE — 99199 UNLISTED SPECIAL SVC PX/RPRT: CPT

## 2024-02-12 ENCOUNTER — APPOINTMENT (OUTPATIENT)
Dept: CHIROPRACTIC MEDICINE | Facility: OTHER | Age: 49
End: 2024-02-12

## 2024-02-12 ENCOUNTER — APPOINTMENT (OUTPATIENT)
Dept: OCCUPATIONAL MEDICINE | Facility: OTHER | Age: 49
End: 2024-02-12

## 2024-02-12 PROCEDURE — 99499 UNLISTED E&M SERVICE: CPT

## 2024-06-01 ENCOUNTER — HOSPITAL ENCOUNTER (EMERGENCY)
Facility: HOSPITAL | Age: 49
Discharge: LEFT BEFORE DISCHARGE | End: 2024-06-01
Admitting: FAMILY MEDICINE

## 2024-06-01 VITALS
DIASTOLIC BLOOD PRESSURE: 111 MMHG | OXYGEN SATURATION: 97 % | WEIGHT: 162.59 LBS | TEMPERATURE: 97.3 F | SYSTOLIC BLOOD PRESSURE: 174 MMHG | BODY MASS INDEX: 24.72 KG/M2 | HEART RATE: 67 BPM | RESPIRATION RATE: 18 BRPM

## 2024-06-01 LAB
ALBUMIN SERPL BCG-MCNC: 4 G/DL (ref 3.5–5.2)
ALP SERPL-CCNC: 100 U/L (ref 40–150)
ALT SERPL W P-5'-P-CCNC: 17 U/L (ref 0–70)
ANION GAP SERPL CALCULATED.3IONS-SCNC: 11 MMOL/L (ref 7–15)
AST SERPL W P-5'-P-CCNC: 27 U/L (ref 0–45)
BASOPHILS # BLD AUTO: 0 10E3/UL (ref 0–0.2)
BASOPHILS NFR BLD AUTO: 0 %
BILIRUB SERPL-MCNC: 0.6 MG/DL
BUN SERPL-MCNC: 13.3 MG/DL (ref 6–20)
CALCIUM SERPL-MCNC: 9.2 MG/DL (ref 8.6–10)
CHLORIDE SERPL-SCNC: 101 MMOL/L (ref 98–107)
CREAT SERPL-MCNC: 0.72 MG/DL (ref 0.67–1.17)
DEPRECATED HCO3 PLAS-SCNC: 22 MMOL/L (ref 22–29)
EGFRCR SERPLBLD CKD-EPI 2021: >90 ML/MIN/1.73M2
EOSINOPHIL # BLD AUTO: 0.1 10E3/UL (ref 0–0.7)
EOSINOPHIL NFR BLD AUTO: 1 %
ERYTHROCYTE [DISTWIDTH] IN BLOOD BY AUTOMATED COUNT: 14.3 % (ref 10–15)
GLUCOSE SERPL-MCNC: 101 MG/DL (ref 70–99)
HCT VFR BLD AUTO: 42.8 % (ref 40–53)
HGB BLD-MCNC: 14 G/DL (ref 13.3–17.7)
HOLD SPECIMEN: NORMAL
IMM GRANULOCYTES # BLD: 0.1 10E3/UL
IMM GRANULOCYTES NFR BLD: 0 %
LYMPHOCYTES # BLD AUTO: 1.4 10E3/UL (ref 0.8–5.3)
LYMPHOCYTES NFR BLD AUTO: 12 %
MCH RBC QN AUTO: 27.5 PG (ref 26.5–33)
MCHC RBC AUTO-ENTMCNC: 32.7 G/DL (ref 31.5–36.5)
MCV RBC AUTO: 84 FL (ref 78–100)
MONOCYTES # BLD AUTO: 0.5 10E3/UL (ref 0–1.3)
MONOCYTES NFR BLD AUTO: 4 %
NEUTROPHILS # BLD AUTO: 10.1 10E3/UL (ref 1.6–8.3)
NEUTROPHILS NFR BLD AUTO: 83 %
NRBC # BLD AUTO: 0 10E3/UL
NRBC BLD AUTO-RTO: 0 /100
PLATELET # BLD AUTO: 327 10E3/UL (ref 150–450)
POTASSIUM SERPL-SCNC: 4 MMOL/L (ref 3.4–5.3)
PROT SERPL-MCNC: 7.8 G/DL (ref 6.4–8.3)
RBC # BLD AUTO: 5.09 10E6/UL (ref 4.4–5.9)
SODIUM SERPL-SCNC: 134 MMOL/L (ref 135–145)
WBC # BLD AUTO: 12.2 10E3/UL (ref 4–11)

## 2024-06-01 PROCEDURE — 99281 EMR DPT VST MAYX REQ PHY/QHP: CPT

## 2024-06-01 PROCEDURE — 85004 AUTOMATED DIFF WBC COUNT: CPT | Performed by: FAMILY MEDICINE

## 2024-06-01 PROCEDURE — 99281 EMR DPT VST MAYX REQ PHY/QHP: CPT | Performed by: FAMILY MEDICINE

## 2024-06-01 PROCEDURE — 36415 COLL VENOUS BLD VENIPUNCTURE: CPT | Performed by: FAMILY MEDICINE

## 2024-06-01 PROCEDURE — 80053 COMPREHEN METABOLIC PANEL: CPT | Performed by: FAMILY MEDICINE

## 2024-06-01 ASSESSMENT — COLUMBIA-SUICIDE SEVERITY RATING SCALE - C-SSRS
6. HAVE YOU EVER DONE ANYTHING, STARTED TO DO ANYTHING, OR PREPARED TO DO ANYTHING TO END YOUR LIFE?: NO
1. IN THE PAST MONTH, HAVE YOU WISHED YOU WERE DEAD OR WISHED YOU COULD GO TO SLEEP AND NOT WAKE UP?: NO
2. HAVE YOU ACTUALLY HAD ANY THOUGHTS OF KILLING YOURSELF IN THE PAST MONTH?: NO

## 2024-06-01 NOTE — ED TRIAGE NOTES
Patient presents with complaints of severe lower abdominal pain that he states started yesterday and had gotten worse. He denies any nausea, vomiting, diarrhea. Last bowel movement was yesterday and normal per patient.

## 2024-06-15 ENCOUNTER — APPOINTMENT (OUTPATIENT)
Dept: CT IMAGING | Facility: HOSPITAL | Age: 49
End: 2024-06-15
Attending: INTERNAL MEDICINE

## 2024-06-15 ENCOUNTER — HOSPITAL ENCOUNTER (EMERGENCY)
Facility: HOSPITAL | Age: 49
Discharge: HOME | End: 2024-06-15
Attending: INTERNAL MEDICINE | Admitting: INTERNAL MEDICINE

## 2024-06-15 VITALS
SYSTOLIC BLOOD PRESSURE: 158 MMHG | RESPIRATION RATE: 20 BRPM | OXYGEN SATURATION: 97 % | HEART RATE: 74 BPM | DIASTOLIC BLOOD PRESSURE: 95 MMHG | TEMPERATURE: 97.8 F

## 2024-06-15 DIAGNOSIS — M62.830 LUMBAR PARASPINAL MUSCLE SPASM: ICD-10-CM

## 2024-06-15 DIAGNOSIS — K59.09 OTHER CONSTIPATION: ICD-10-CM

## 2024-06-15 LAB
ALBUMIN SERPL BCG-MCNC: 3.9 G/DL (ref 3.5–5.2)
ALBUMIN UR-MCNC: 10 MG/DL
ALP SERPL-CCNC: 87 U/L (ref 40–150)
ALT SERPL W P-5'-P-CCNC: 15 U/L (ref 0–70)
AMPHETAMINES UR QL SCN: ABNORMAL
ANION GAP SERPL CALCULATED.3IONS-SCNC: 11 MMOL/L (ref 7–15)
APPEARANCE UR: CLEAR
AST SERPL W P-5'-P-CCNC: 27 U/L (ref 0–45)
BARBITURATES UR QL SCN: ABNORMAL
BASOPHILS # BLD AUTO: 0 10E3/UL (ref 0–0.2)
BASOPHILS NFR BLD AUTO: 0 %
BENZODIAZ UR QL SCN: ABNORMAL
BILIRUB SERPL-MCNC: 0.4 MG/DL
BILIRUB UR QL STRIP: NEGATIVE
BUN SERPL-MCNC: 12.7 MG/DL (ref 6–20)
BZE UR QL SCN: ABNORMAL
CALCIUM SERPL-MCNC: 9.3 MG/DL (ref 8.6–10)
CANNABINOIDS UR QL SCN: ABNORMAL
CHLORIDE SERPL-SCNC: 103 MMOL/L (ref 98–107)
COLOR UR AUTO: ABNORMAL
CREAT SERPL-MCNC: 0.71 MG/DL (ref 0.67–1.17)
CRP SERPL-MCNC: 6.56 MG/L
DEPRECATED HCO3 PLAS-SCNC: 23 MMOL/L (ref 22–29)
EGFRCR SERPLBLD CKD-EPI 2021: >90 ML/MIN/1.73M2
EOSINOPHIL # BLD AUTO: 0.2 10E3/UL (ref 0–0.7)
EOSINOPHIL NFR BLD AUTO: 2 %
ERYTHROCYTE [DISTWIDTH] IN BLOOD BY AUTOMATED COUNT: 14.3 % (ref 10–15)
ETHANOL SERPL-MCNC: <0.01 G/DL
FENTANYL UR QL: ABNORMAL
GLUCOSE SERPL-MCNC: 88 MG/DL (ref 70–99)
GLUCOSE UR STRIP-MCNC: NEGATIVE MG/DL
HCT VFR BLD AUTO: 40.3 % (ref 40–53)
HGB BLD-MCNC: 12.9 G/DL (ref 13.3–17.7)
HGB UR QL STRIP: NEGATIVE
IMM GRANULOCYTES # BLD: 0 10E3/UL
IMM GRANULOCYTES NFR BLD: 0 %
KETONES UR STRIP-MCNC: NEGATIVE MG/DL
LEUKOCYTE ESTERASE UR QL STRIP: NEGATIVE
LIPASE SERPL-CCNC: 12 U/L (ref 13–60)
LYMPHOCYTES # BLD AUTO: 1.7 10E3/UL (ref 0.8–5.3)
LYMPHOCYTES NFR BLD AUTO: 14 %
MCH RBC QN AUTO: 27.7 PG (ref 26.5–33)
MCHC RBC AUTO-ENTMCNC: 32 G/DL (ref 31.5–36.5)
MCV RBC AUTO: 87 FL (ref 78–100)
MONOCYTES # BLD AUTO: 0.8 10E3/UL (ref 0–1.3)
MONOCYTES NFR BLD AUTO: 7 %
NEUTROPHILS # BLD AUTO: 9.4 10E3/UL (ref 1.6–8.3)
NEUTROPHILS NFR BLD AUTO: 77 %
NITRATE UR QL: NEGATIVE
NRBC # BLD AUTO: 0 10E3/UL
NRBC BLD AUTO-RTO: 0 /100
OPIATES UR QL SCN: ABNORMAL
PCP QUAL URINE (ROCHE): ABNORMAL
PH UR STRIP: 8.5 [PH] (ref 4.7–8)
PLATELET # BLD AUTO: 296 10E3/UL (ref 150–450)
POTASSIUM SERPL-SCNC: 3.7 MMOL/L (ref 3.4–5.3)
PROT SERPL-MCNC: 7.3 G/DL (ref 6.4–8.3)
RBC # BLD AUTO: 4.66 10E6/UL (ref 4.4–5.9)
RBC URINE: 1 /HPF
SODIUM SERPL-SCNC: 137 MMOL/L (ref 135–145)
SP GR UR STRIP: 1.01 (ref 1–1.03)
SQUAMOUS EPITHELIAL: 0 /HPF
UROBILINOGEN UR STRIP-MCNC: NORMAL MG/DL
WBC # BLD AUTO: 12.1 10E3/UL (ref 4–11)
WBC URINE: 5 /HPF

## 2024-06-15 PROCEDURE — 86140 C-REACTIVE PROTEIN: CPT | Performed by: INTERNAL MEDICINE

## 2024-06-15 PROCEDURE — 258N000003 HC RX IP 258 OP 636: Mod: JZ | Performed by: INTERNAL MEDICINE

## 2024-06-15 PROCEDURE — 96374 THER/PROPH/DIAG INJ IV PUSH: CPT | Mod: XU

## 2024-06-15 PROCEDURE — 96361 HYDRATE IV INFUSION ADD-ON: CPT

## 2024-06-15 PROCEDURE — 96376 TX/PRO/DX INJ SAME DRUG ADON: CPT

## 2024-06-15 PROCEDURE — 250N000013 HC RX MED GY IP 250 OP 250 PS 637: Performed by: STUDENT IN AN ORGANIZED HEALTH CARE EDUCATION/TRAINING PROGRAM

## 2024-06-15 PROCEDURE — 96375 TX/PRO/DX INJ NEW DRUG ADDON: CPT

## 2024-06-15 PROCEDURE — 82077 ASSAY SPEC XCP UR&BREATH IA: CPT | Performed by: INTERNAL MEDICINE

## 2024-06-15 PROCEDURE — 250N000011 HC RX IP 250 OP 636: Mod: JZ | Performed by: INTERNAL MEDICINE

## 2024-06-15 PROCEDURE — 80053 COMPREHEN METABOLIC PANEL: CPT | Performed by: INTERNAL MEDICINE

## 2024-06-15 PROCEDURE — 80307 DRUG TEST PRSMV CHEM ANLYZR: CPT | Performed by: INTERNAL MEDICINE

## 2024-06-15 PROCEDURE — 81001 URINALYSIS AUTO W/SCOPE: CPT | Mod: XU | Performed by: INTERNAL MEDICINE

## 2024-06-15 PROCEDURE — 83690 ASSAY OF LIPASE: CPT | Performed by: INTERNAL MEDICINE

## 2024-06-15 PROCEDURE — 99284 EMERGENCY DEPT VISIT MOD MDM: CPT | Performed by: INTERNAL MEDICINE

## 2024-06-15 PROCEDURE — 74177 CT ABD & PELVIS W/CONTRAST: CPT

## 2024-06-15 PROCEDURE — 85025 COMPLETE CBC W/AUTO DIFF WBC: CPT | Performed by: INTERNAL MEDICINE

## 2024-06-15 PROCEDURE — 99285 EMERGENCY DEPT VISIT HI MDM: CPT | Mod: 25

## 2024-06-15 PROCEDURE — 250N000011 HC RX IP 250 OP 636: Mod: JZ | Performed by: STUDENT IN AN ORGANIZED HEALTH CARE EDUCATION/TRAINING PROGRAM

## 2024-06-15 PROCEDURE — 36415 COLL VENOUS BLD VENIPUNCTURE: CPT | Performed by: INTERNAL MEDICINE

## 2024-06-15 RX ORDER — POLYETHYLENE GLYCOL 3350 17 G/17G
1 POWDER, FOR SOLUTION ORAL DAILY
Qty: 527 G | Refills: 0 | Status: SHIPPED | OUTPATIENT
Start: 2024-06-15 | End: 2024-07-15

## 2024-06-15 RX ORDER — ONDANSETRON 2 MG/ML
4 INJECTION INTRAMUSCULAR; INTRAVENOUS ONCE
Status: COMPLETED | OUTPATIENT
Start: 2024-06-15 | End: 2024-06-15

## 2024-06-15 RX ORDER — HYDROMORPHONE HYDROCHLORIDE 1 MG/ML
0.5 INJECTION, SOLUTION INTRAMUSCULAR; INTRAVENOUS; SUBCUTANEOUS ONCE
Status: COMPLETED | OUTPATIENT
Start: 2024-06-15 | End: 2024-06-15

## 2024-06-15 RX ORDER — METHOCARBAMOL 500 MG/1
500 TABLET, FILM COATED ORAL 4 TIMES DAILY PRN
Qty: 40 TABLET | Refills: 0 | Status: SHIPPED | OUTPATIENT
Start: 2024-06-15

## 2024-06-15 RX ORDER — IOPAMIDOL 755 MG/ML
79 INJECTION, SOLUTION INTRAVASCULAR ONCE
Status: COMPLETED | OUTPATIENT
Start: 2024-06-15 | End: 2024-06-15

## 2024-06-15 RX ORDER — SENNA AND DOCUSATE SODIUM 50; 8.6 MG/1; MG/1
1 TABLET, FILM COATED ORAL 2 TIMES DAILY
Qty: 30 TABLET | Refills: 0 | Status: SHIPPED | OUTPATIENT
Start: 2024-06-15

## 2024-06-15 RX ORDER — MAGNESIUM CARB/ALUMINUM HYDROX 105-160MG
296 TABLET,CHEWABLE ORAL ONCE
Status: COMPLETED | OUTPATIENT
Start: 2024-06-15 | End: 2024-06-15

## 2024-06-15 RX ORDER — SENNA AND DOCUSATE SODIUM 50; 8.6 MG/1; MG/1
1 TABLET, FILM COATED ORAL 2 TIMES DAILY
Qty: 30 TABLET | Refills: 0 | Status: SHIPPED | OUTPATIENT
Start: 2024-06-15 | End: 2024-06-15

## 2024-06-15 RX ORDER — KETOROLAC TROMETHAMINE 30 MG/ML
30 INJECTION, SOLUTION INTRAMUSCULAR; INTRAVENOUS ONCE
Status: COMPLETED | OUTPATIENT
Start: 2024-06-15 | End: 2024-06-15

## 2024-06-15 RX ORDER — METHOCARBAMOL 500 MG/1
500 TABLET, FILM COATED ORAL 4 TIMES DAILY PRN
Qty: 40 TABLET | Refills: 0 | Status: SHIPPED | OUTPATIENT
Start: 2024-06-15 | End: 2024-06-15

## 2024-06-15 RX ADMIN — IOPAMIDOL 79 ML: 755 INJECTION, SOLUTION INTRAVENOUS at 06:32

## 2024-06-15 RX ADMIN — HYDROMORPHONE HYDROCHLORIDE 0.5 MG: 1 INJECTION, SOLUTION INTRAMUSCULAR; INTRAVENOUS; SUBCUTANEOUS at 07:26

## 2024-06-15 RX ADMIN — SODIUM CHLORIDE 1000 ML: 9 INJECTION, SOLUTION INTRAVENOUS at 06:04

## 2024-06-15 RX ADMIN — KETOROLAC TROMETHAMINE 30 MG: 30 INJECTION, SOLUTION INTRAMUSCULAR at 05:15

## 2024-06-15 RX ADMIN — HYDROMORPHONE HYDROCHLORIDE 0.5 MG: 1 INJECTION, SOLUTION INTRAMUSCULAR; INTRAVENOUS; SUBCUTANEOUS at 05:27

## 2024-06-15 RX ADMIN — BE HEALTH MAGNESIUM CITRATE ORAL SOLUTION - CHERRY 296 ML: 1.75 LIQUID ORAL at 08:08

## 2024-06-15 RX ADMIN — ONDANSETRON 4 MG: 2 INJECTION INTRAMUSCULAR; INTRAVENOUS at 05:27

## 2024-06-15 ASSESSMENT — ENCOUNTER SYMPTOMS
NAUSEA: 1
ABDOMINAL PAIN: 1
BLOOD IN STOOL: 0
MYALGIAS: 0
FLANK PAIN: 0
COLOR CHANGE: 0
WEAKNESS: 0
VOICE CHANGE: 0
CHEST TIGHTNESS: 0
NUMBNESS: 0
DIZZINESS: 0
SLEEP DISTURBANCE: 0
HEADACHES: 0
FEVER: 0
DIAPHORESIS: 0
ANAL BLEEDING: 0
LIGHT-HEADEDNESS: 0
DYSURIA: 0
ABDOMINAL DISTENTION: 0
PALPITATIONS: 0
FREQUENCY: 0
VOMITING: 0
NECK PAIN: 0
SHORTNESS OF BREATH: 0
CHILLS: 0
CONFUSION: 0
BACK PAIN: 0
COUGH: 0
WHEEZING: 0

## 2024-06-15 ASSESSMENT — COLUMBIA-SUICIDE SEVERITY RATING SCALE - C-SSRS
2. HAVE YOU ACTUALLY HAD ANY THOUGHTS OF KILLING YOURSELF IN THE PAST MONTH?: NO
1. IN THE PAST MONTH, HAVE YOU WISHED YOU WERE DEAD OR WISHED YOU COULD GO TO SLEEP AND NOT WAKE UP?: NO
6. HAVE YOU EVER DONE ANYTHING, STARTED TO DO ANYTHING, OR PREPARED TO DO ANYTHING TO END YOUR LIFE?: NO

## 2024-06-15 ASSESSMENT — ACTIVITIES OF DAILY LIVING (ADL)
ADLS_ACUITY_SCORE: 33
ADLS_ACUITY_SCORE: 35

## 2024-06-15 NOTE — ED PROVIDER NOTES
Assumed care from Dr. Jensen at 7 AM, pending CT scan read.     8:02 AM  Patient feeling better after IV Dilaudid.  CRP elevated but I do think this is most likely chronic, lab work otherwise is reassuring.  CT scan benign but does show diffuse constipation.  I counseled the patient on high-fiber diet, drink plenty of water.  Additionally, magnesium citrate given here, stool softener and laxative sent to pharmacy.  It is possible that the patient also has a superimposed lumbar spasm.  Patient denies any cauda equina type symptoms.  Counseled, stretching exercises, heating pad, to follow-up with his primary care physician as needed.  Short course of Robaxin sent to pharmacy.  Patient counseled to return here with any concerning issues.  Patient, friend, voiced understanding agreement.  Return precautions given and agreed to.  Patient stable for discharge.     Zachery Malagon,   06/15/24 0803

## 2024-06-15 NOTE — ED NOTES
AVS reviewed. All questions and concerns answered. Education reviewed, pt states no further questions at this time.     General

## 2024-06-15 NOTE — ED PROVIDER NOTES
History     Chief Complaint   Patient presents with    Flank Pain     Dx of kidney stones of over a week     The history is provided by the patient.   Abdominal Pain  Pain location:  L flank and R flank  Pain radiates to:  Back  Pain severity:  Severe  Onset quality:  Gradual  Duration:  1 day  Timing:  Constant  Chronicity:  Recurrent  Associated symptoms: nausea    Associated symptoms: no chest pain, no chills, no cough, no dysuria, no fever, no shortness of breath and no vomiting      Allergies:  Allergies   Allergen Reactions    Keflex [Cephalexin Hcl]        Problem List:    Patient Active Problem List    Diagnosis Date Noted    ACP (advance care planning) 11/28/2016     Priority: Medium     Advance Care Planning 11/28/2016: ACP Review of Chart / Resources Provided:  Reviewed chart for advance care plan.  Brijesh Flynn has no plan or code status on file. Discussed available resources and provided with information. Confirmed code status reflects current choices pending further ACP discussions.  Confirmed/documented legally designated decision makers.  Added by Trinidad Celeste            Essential hypertension 06/20/2013     Priority: Medium     Overview:   IMO Update          Past Medical History:    No past medical history on file.    Past Surgical History:    No past surgical history on file.    Family History:    Family History   Family history unknown: Yes       Social History:  Marital Status:  Single [1]  Social History     Tobacco Use    Smoking status: Every Day     Current packs/day: 0.50     Types: Cigarettes, Vaping Device    Smokeless tobacco: Never   Substance Use Topics    Alcohol use: No     Alcohol/week: 0.0 standard drinks of alcohol    Drug use: Not Currently        Medications:    methocarbamol (ROBAXIN) 500 MG tablet  polyethylene glycol (MIRALAX) 17 GM/Dose powder  SENNA-docusate sodium (SENNA S) 8.6-50 MG tablet          Review of Systems   Constitutional:  Negative for chills,  diaphoresis and fever.   HENT:  Negative for voice change.    Eyes:  Negative for visual disturbance.   Respiratory:  Negative for cough, chest tightness, shortness of breath and wheezing.    Cardiovascular:  Negative for chest pain, palpitations and leg swelling.   Gastrointestinal:  Positive for abdominal pain and nausea. Negative for abdominal distention, anal bleeding, blood in stool and vomiting.   Genitourinary:  Negative for decreased urine volume, dysuria, flank pain and frequency.   Musculoskeletal:  Negative for back pain, gait problem, myalgias and neck pain.   Skin:  Negative for color change, pallor and rash.   Neurological:  Negative for dizziness, syncope, weakness, light-headedness, numbness and headaches.   Psychiatric/Behavioral:  Negative for confusion, sleep disturbance and suicidal ideas.        Physical Exam   BP: (!) 166/114  Pulse: 78  Temp: 98.7  F (37.1  C)  Resp: 22  SpO2: 97 %      Physical Exam  Vitals and nursing note reviewed.   Constitutional:       Appearance: He is well-developed.   HENT:      Head: Normocephalic and atraumatic.   Eyes:      Conjunctiva/sclera: Conjunctivae normal.      Pupils: Pupils are equal, round, and reactive to light.   Neck:      Thyroid: No thyromegaly.      Vascular: No JVD.      Trachea: No tracheal deviation.   Cardiovascular:      Rate and Rhythm: Normal rate and regular rhythm.      Heart sounds: Normal heart sounds. No murmur heard.     No gallop.   Pulmonary:      Effort: Pulmonary effort is normal. No respiratory distress.      Breath sounds: Normal breath sounds. No stridor. No wheezing or rales.   Chest:      Chest wall: No tenderness.   Abdominal:      General: Bowel sounds are normal. There is no distension.      Palpations: Abdomen is soft. There is no mass.      Tenderness: There is no abdominal tenderness. There is no guarding or rebound.   Musculoskeletal:         General: No tenderness. Normal range of motion.      Cervical back: Normal  range of motion and neck supple.   Lymphadenopathy:      Cervical: No cervical adenopathy.   Skin:     General: Skin is warm.      Coloration: Skin is not pale.      Findings: No erythema or rash.   Neurological:      Mental Status: He is alert and oriented to person, place, and time.   Psychiatric:         Behavior: Behavior normal.         ED Course        Procedures                Results for orders placed or performed during the hospital encounter of 06/15/24 (from the past 24 hour(s))   Comprehensive metabolic panel   Result Value Ref Range    Sodium 137 135 - 145 mmol/L    Potassium 3.7 3.4 - 5.3 mmol/L    Carbon Dioxide (CO2) 23 22 - 29 mmol/L    Anion Gap 11 7 - 15 mmol/L    Urea Nitrogen 12.7 6.0 - 20.0 mg/dL    Creatinine 0.71 0.67 - 1.17 mg/dL    GFR Estimate >90 >60 mL/min/1.73m2    Calcium 9.3 8.6 - 10.0 mg/dL    Chloride 103 98 - 107 mmol/L    Glucose 88 70 - 99 mg/dL    Alkaline Phosphatase 87 40 - 150 U/L    AST 27 0 - 45 U/L    ALT 15 0 - 70 U/L    Protein Total 7.3 6.4 - 8.3 g/dL    Albumin 3.9 3.5 - 5.2 g/dL    Bilirubin Total 0.4 <=1.2 mg/dL   Lipase   Result Value Ref Range    Lipase 12 (L) 13 - 60 U/L   CBC with Platelets & Differential    Narrative    The following orders were created for panel order CBC with Platelets & Differential.  Procedure                               Abnormality         Status                     ---------                               -----------         ------                     CBC with platelets and d...[[id number]]  Abnormal            Final result                 Please view results for these tests on the individual orders.   Ethyl Alcohol Level   Result Value Ref Range    Alcohol ethyl <0.01 <=0.01 g/dL   CBC with platelets and differential   Result Value Ref Range    WBC Count 12.1 (H) 4.0 - 11.0 10e3/uL    RBC Count 4.66 4.40 - 5.90 10e6/uL    Hemoglobin 12.9 (L) 13.3 - 17.7 g/dL    Hematocrit 40.3 40.0 - 53.0 %    MCV 87 78 - 100 fL    MCH 27.7 26.5 - 33.0 pg     MCHC 32.0 31.5 - 36.5 g/dL    RDW 14.3 10.0 - 15.0 %    Platelet Count 296 150 - 450 10e3/uL    % Neutrophils 77 %    % Lymphocytes 14 %    % Monocytes 7 %    % Eosinophils 2 %    % Basophils 0 %    % Immature Granulocytes 0 %    NRBCs per 100 WBC 0 <1 /100    Absolute Neutrophils 9.4 (H) 1.6 - 8.3 10e3/uL    Absolute Lymphocytes 1.7 0.8 - 5.3 10e3/uL    Absolute Monocytes 0.8 0.0 - 1.3 10e3/uL    Absolute Eosinophils 0.2 0.0 - 0.7 10e3/uL    Absolute Basophils 0.0 0.0 - 0.2 10e3/uL    Absolute Immature Granulocytes 0.0 <=0.4 10e3/uL    Absolute NRBCs 0.0 10e3/uL   CRP inflammation   Result Value Ref Range    CRP Inflammation 6.56 (H) <5.00 mg/L   Abd/pelvis CT - IV contrast only TRAUMA / AAA    Narrative    EXAMINATION: CT ABDOMEN PELVIS W CONTRAST, 6/15/2024 6:39 AM    TECHNIQUE:  Helical CT images from the lung bases through the  symphysis pubis were obtained  with IV contrast. Contrast dose: Isovue  370 79ml    COMPARISON: none    HISTORY: back pain, bilateral flank pain    FINDINGS:    There is dependent atelectasis at the lung bases.    The liver is free of masses or biliary ductal enlargement. No  calcified gallstones are seen.    The the spleen and pancreas appear normal.    The adrenal glands are normal.    There is a benign-appearing cyst seen in the left kidney. There is a 2  mm diameter intrarenal calculi bilaterally. No ureteric calculi are  seen.    The periaortic lymph nodes are normal in caliber.    There is distention of the ascending and transverse colon with gas and  feces.    In the pelvis the bladder and rectum appear normal.    The regional skeleton is intact      Impression    IMPRESSION: There is distention of the ascending and transverse colon  with gas and feces.    Small intrarenal calculi are noted bilaterally no ureteric calculi are  seen    GWENDOLYN NARANJO MD         SYSTEM ID:  [id number]   UA Macroscopic with reflex to Microscopic and Culture    Specimen: Urine, NOS   Result Value  Ref Range    Color Urine Light Yellow Colorless, Straw, Light Yellow, Yellow    Appearance Urine Clear Clear    Glucose Urine Negative Negative mg/dL    Bilirubin Urine Negative Negative    Ketones Urine Negative Negative mg/dL    Specific Gravity Urine 1.010 1.003 - 1.035    Blood Urine Negative Negative    pH Urine 8.5 (H) 4.7 - 8.0    Protein Albumin Urine 10 (A) Negative mg/dL    Urobilinogen Urine Normal Normal, 2.0 mg/dL    Nitrite Urine Negative Negative    Leukocyte Esterase Urine Negative Negative    RBC Urine 1 <=2 /HPF    WBC Urine 5 <=5 /HPF    Squamous Epithelials Urine 0 <=1 /HPF    Narrative    Urine Culture not indicated   Urine Drug Screen    Narrative    The following orders were created for panel order Urine Drug Screen.  Procedure                               Abnormality         Status                     ---------                               -----------         ------                     Urine Drug Screen Panel[[id number]]      Abnormal            Final result                 Please view results for these tests on the individual orders.   Urine Drug Screen Panel   Result Value Ref Range    Amphetamines Urine Screen Positive (A) Screen Negative    Barbituates Urine Screen Negative Screen Negative    Benzodiazepine Urine Screen Negative Screen Negative    Cannabinoids Urine Screen Negative Screen Negative    Cocaine Urine Screen Negative Screen Negative    Fentanyl Qual Urine Screen Negative Screen Negative    Opiates Urine Screen Negative Screen Negative    PCP Urine Screen Negative Screen Negative       Medications   ketorolac (TORADOL) injection 30 mg (30 mg Intravenous $Given 6/15/24 0515)   HYDROmorphone (PF) (DILAUDID) injection 0.5 mg (0.5 mg Intravenous $Given 6/15/24 0527)   ondansetron (ZOFRAN) injection 4 mg (4 mg Intravenous $Given 6/15/24 0527)   iopamidol (ISOVUE-370) solution 79 mL (79 mLs Intravenous $Given 6/15/24 0632)   sodium chloride (PF) 0.9% PF flush 50 mL (50 mLs  Intravenous $Given 6/15/24 0633)   sodium chloride 0.9% BOLUS 1,000 mL (0 mLs Intravenous Stopped 6/15/24 0808)   HYDROmorphone (PF) (DILAUDID) injection 0.5 mg (0.5 mg Intravenous $Given 6/15/24 0726)   magnesium citrate solution 296 mL (296 mLs Oral $Given 6/15/24 0808)       Assessments & Plan (with Medical Decision Making)   Bilateral flank pain , hx of kidney stone    Labs reviewed  CT abd pending  S/o to Dr Malagon  at the change of shift.     I have reviewed the nursing notes.    I have reviewed the findings, diagnosis, plan and need for follow up with the patient.        Discharge Medication List as of 6/15/2024  8:06 AM        START taking these medications    Details   polyethylene glycol (MIRALAX) 17 GM/Dose powder Take 17 g (1 Capful) by mouth daily for 30 days, Disp-527 g, R-0, E-Prescribe             Final diagnoses:   Other constipation   Lumbar paraspinal muscle spasm       6/15/2024   [facility]       Navjot Jensen MD  06/15/24 3672

## 2024-06-15 NOTE — ED TRIAGE NOTES
Patient presents to this ED, accompanied by his brother, via personal vehicle, for flank pain he rates 20/10. He has a hx of kidney stones years ago x 2. He was dx last week with kidney stones and was sent home. He felt better for about 2 days and symptoms returned and much worse than a week ago. He is able to void ok at this time. No ETOH on board.

## 2024-06-16 ENCOUNTER — HOSPITAL ENCOUNTER (EMERGENCY)
Facility: HOSPITAL | Age: 49
Discharge: HOME | End: 2024-06-16
Attending: INTERNAL MEDICINE | Admitting: INTERNAL MEDICINE

## 2024-06-16 VITALS
OXYGEN SATURATION: 95 % | RESPIRATION RATE: 18 BRPM | HEART RATE: 84 BPM | DIASTOLIC BLOOD PRESSURE: 96 MMHG | TEMPERATURE: 98.2 F | SYSTOLIC BLOOD PRESSURE: 178 MMHG

## 2024-06-16 DIAGNOSIS — K59.00 CONSTIPATION, UNSPECIFIED CONSTIPATION TYPE: ICD-10-CM

## 2024-06-16 PROCEDURE — 99283 EMERGENCY DEPT VISIT LOW MDM: CPT | Performed by: INTERNAL MEDICINE

## 2024-06-16 PROCEDURE — 99283 EMERGENCY DEPT VISIT LOW MDM: CPT

## 2024-06-16 PROCEDURE — 250N000013 HC RX MED GY IP 250 OP 250 PS 637: Performed by: INTERNAL MEDICINE

## 2024-06-16 RX ORDER — BISACODYL 10 MG
10 SUPPOSITORY, RECTAL RECTAL ONCE
Status: COMPLETED | OUTPATIENT
Start: 2024-06-16 | End: 2024-06-16

## 2024-06-16 RX ORDER — BISACODYL 10 MG
10 SUPPOSITORY, RECTAL RECTAL DAILY PRN
Qty: 5 SUPPOSITORY | Refills: 0 | Status: SHIPPED | OUTPATIENT
Start: 2024-06-16 | End: 2024-06-21

## 2024-06-16 RX ORDER — BISACODYL 10 MG
10 SUPPOSITORY, RECTAL RECTAL DAILY PRN
COMMUNITY

## 2024-06-16 RX ADMIN — BISACODYL 10 MG: 10 SUPPOSITORY RECTAL at 03:15

## 2024-06-16 RX ADMIN — SODIUM PHOSPHATE, DIBASIC AND SODIUM PHOSPHATE, MONOBASIC 1 ENEMA: 7; 19 ENEMA RECTAL at 03:52

## 2024-06-16 ASSESSMENT — ENCOUNTER SYMPTOMS
BACK PAIN: 1
CONFUSION: 0
CHILLS: 0
ABDOMINAL PAIN: 0
NAUSEA: 0
ANAL BLEEDING: 0
DIAPHORESIS: 0
COLOR CHANGE: 0
NECK PAIN: 0
CONSTIPATION: 1
DYSURIA: 0
DIZZINESS: 0
HEADACHES: 0
VOMITING: 0
WEAKNESS: 0
MYALGIAS: 0
FEVER: 0
FREQUENCY: 0
VOICE CHANGE: 0
NUMBNESS: 0
CHEST TIGHTNESS: 0
FLANK PAIN: 0
SHORTNESS OF BREATH: 0
COUGH: 0
BLOOD IN STOOL: 0
LIGHT-HEADEDNESS: 0
ABDOMINAL DISTENTION: 0
SLEEP DISTURBANCE: 0
PALPITATIONS: 0
WHEEZING: 0

## 2024-06-16 ASSESSMENT — ACTIVITIES OF DAILY LIVING (ADL)
ADLS_ACUITY_SCORE: 35
ADLS_ACUITY_SCORE: 35
ADLS_ACUITY_SCORE: 33
ADLS_ACUITY_SCORE: 35

## 2024-06-16 ASSESSMENT — COLUMBIA-SUICIDE SEVERITY RATING SCALE - C-SSRS
2. HAVE YOU ACTUALLY HAD ANY THOUGHTS OF KILLING YOURSELF IN THE PAST MONTH?: NO
6. HAVE YOU EVER DONE ANYTHING, STARTED TO DO ANYTHING, OR PREPARED TO DO ANYTHING TO END YOUR LIFE?: NO
1. IN THE PAST MONTH, HAVE YOU WISHED YOU WERE DEAD OR WISHED YOU COULD GO TO SLEEP AND NOT WAKE UP?: NO

## 2024-06-16 NOTE — ED NOTES
"Patient was able to have a medium BM and bowel sounds improving. Patient concerned about how he will have more BMs. A new Rx for Dulcolax Suppositories sent to [facility]. Patient still has Mag Citrate at home as well as Miralax (which pt. Has taken none of). Encouraged pt. To utilize all resources and that this is what we would do in the ER. Explained that the only way to get rid of the pain is to get the BM out. Patient verbalized understanding but was frustrated that we couldn't \"fix\" him up right away.   "

## 2024-06-16 NOTE — ED TRIAGE NOTES
Patient presents today with complaints of abdominal pain. He was driven by his Mother. Patient was seen for flank pain in our ED last night and was diagnosed with constipation. Patient reports taking meds prescribed last night and has had no BMs. Patient reports feeling bloated and not really passing gas. Bowel sounds are quiet and tinkling. Abdominal distension noted.      Triage Assessment (Adult)       Row Name 06/16/24 0241          Triage Assessment    Airway WDL WDL        Respiratory WDL    Respiratory WDL WDL        Skin Circulation/Temperature WDL    Skin Circulation/Temperature WDL WDL        Cardiac WDL    Cardiac WDL WDL        Peripheral/Neurovascular WDL    Peripheral Neurovascular WDL WDL        Cognitive/Neuro/Behavioral WDL    Cognitive/Neuro/Behavioral WDL WDL

## 2024-06-16 NOTE — ED PROVIDER NOTES
History     Chief Complaint   Patient presents with    Constipation     Had full w/up on 6/15. Dx Constipation     The history is provided by the patient.   Constipation  Severity:  Moderate  Time since last bowel movement:  3 days  Timing:  Constant  Progression:  Worsening  Chronicity:  Recurrent  Context: narcotics    Associated symptoms: back pain    Associated symptoms: no abdominal pain, no dysuria, no fever, no nausea and no vomiting          Allergies:  Allergies   Allergen Reactions    Keflex [Cephalexin Hcl]        Problem List:    Patient Active Problem List    Diagnosis Date Noted    ACP (advance care planning) 11/28/2016     Priority: Medium     Advance Care Planning 11/28/2016: ACP Review of Chart / Resources Provided:  Reviewed chart for advance care plan.  Brijesh Flynn has no plan or code status on file. Discussed available resources and provided with information. Confirmed code status reflects current choices pending further ACP discussions.  Confirmed/documented legally designated decision makers.  Added by Trinidad Celeste            Essential hypertension 06/20/2013     Priority: Medium     Overview:   IMO Update          Past Medical History:    No past medical history on file.    Past Surgical History:    No past surgical history on file.    Family History:    Family History   Family history unknown: Yes       Social History:  Marital Status:  Single [1]  Social History     Tobacco Use    Smoking status: Every Day     Current packs/day: 0.50     Types: Cigarettes, Vaping Device    Smokeless tobacco: Never   Substance Use Topics    Alcohol use: No     Alcohol/week: 0.0 standard drinks of alcohol    Drug use: Not Currently        Medications:    bisacodyl (DULCOLAX) 10 MG suppository  methocarbamol (ROBAXIN) 500 MG tablet  polyethylene glycol (MIRALAX) 17 GM/Dose powder  SENNA-docusate sodium (SENNA S) 8.6-50 MG tablet          Review of Systems   Constitutional:  Negative for chills,  diaphoresis and fever.   HENT:  Negative for voice change.    Eyes:  Negative for visual disturbance.   Respiratory:  Negative for cough, chest tightness, shortness of breath and wheezing.    Cardiovascular:  Negative for chest pain, palpitations and leg swelling.   Gastrointestinal:  Positive for constipation. Negative for abdominal distention, abdominal pain, anal bleeding, blood in stool, nausea and vomiting.   Genitourinary:  Negative for decreased urine volume, dysuria, flank pain and frequency.   Musculoskeletal:  Positive for back pain. Negative for gait problem, myalgias and neck pain.   Skin:  Negative for color change, pallor and rash.   Neurological:  Negative for dizziness, syncope, weakness, light-headedness, numbness and headaches.   Psychiatric/Behavioral:  Negative for confusion, sleep disturbance and suicidal ideas.        Physical Exam   BP: 178/96  Pulse: 74  Temp: 97.6  F (36.4  C)  Resp: 18  SpO2: 95 %      Physical Exam  Vitals and nursing note reviewed.   Constitutional:       Appearance: He is well-developed.   HENT:      Head: Normocephalic and atraumatic.   Eyes:      Conjunctiva/sclera: Conjunctivae normal.      Pupils: Pupils are equal, round, and reactive to light.   Neck:      Thyroid: No thyromegaly.      Vascular: No JVD.      Trachea: No tracheal deviation.   Cardiovascular:      Rate and Rhythm: Normal rate and regular rhythm.      Heart sounds: Normal heart sounds. No murmur heard.     No gallop.   Pulmonary:      Effort: Pulmonary effort is normal. No respiratory distress.      Breath sounds: Normal breath sounds. No stridor. No wheezing or rales.   Chest:      Chest wall: No tenderness.   Abdominal:      General: Bowel sounds are normal. There is no distension.      Palpations: Abdomen is soft. There is no mass.      Tenderness: There is no abdominal tenderness. There is no guarding or rebound.   Musculoskeletal:         General: No tenderness. Normal range of motion.       Cervical back: Normal range of motion and neck supple.   Lymphadenopathy:      Cervical: No cervical adenopathy.   Skin:     General: Skin is warm.      Coloration: Skin is not pale.      Findings: No erythema or rash.   Neurological:      Mental Status: He is alert and oriented to person, place, and time.   Psychiatric:         Behavior: Behavior normal.         ED Course        Procedures                  Results for orders placed or performed during the hospital encounter of 06/15/24 (from the past 24 hour(s))   Abd/pelvis CT - IV contrast only TRAUMA / AAA    Narrative    EXAMINATION: CT ABDOMEN PELVIS W CONTRAST, 6/15/2024 6:39 AM    TECHNIQUE:  Helical CT images from the lung bases through the  symphysis pubis were obtained  with IV contrast. Contrast dose: Isovue  370 79ml    COMPARISON: none    HISTORY: back pain, bilateral flank pain    FINDINGS:    There is dependent atelectasis at the lung bases.    The liver is free of masses or biliary ductal enlargement. No  calcified gallstones are seen.    The the spleen and pancreas appear normal.    The adrenal glands are normal.    There is a benign-appearing cyst seen in the left kidney. There is a 2  mm diameter intrarenal calculi bilaterally. No ureteric calculi are  seen.    The periaortic lymph nodes are normal in caliber.    There is distention of the ascending and transverse colon with gas and  feces.    In the pelvis the bladder and rectum appear normal.    The regional skeleton is intact      Impression    IMPRESSION: There is distention of the ascending and transverse colon  with gas and feces.    Small intrarenal calculi are noted bilaterally no ureteric calculi are  seen    GWENDOLYN NARANJO MD         SYSTEM ID:  [id number]   UA Macroscopic with reflex to Microscopic and Culture    Specimen: Urine, NOS   Result Value Ref Range    Color Urine Light Yellow Colorless, Straw, Light Yellow, Yellow    Appearance Urine Clear Clear    Glucose Urine Negative  Negative mg/dL    Bilirubin Urine Negative Negative    Ketones Urine Negative Negative mg/dL    Specific Gravity Urine 1.010 1.003 - 1.035    Blood Urine Negative Negative    pH Urine 8.5 (H) 4.7 - 8.0    Protein Albumin Urine 10 (A) Negative mg/dL    Urobilinogen Urine Normal Normal, 2.0 mg/dL    Nitrite Urine Negative Negative    Leukocyte Esterase Urine Negative Negative    RBC Urine 1 <=2 /HPF    WBC Urine 5 <=5 /HPF    Squamous Epithelials Urine 0 <=1 /HPF    Narrative    Urine Culture not indicated   Urine Drug Screen    Narrative    The following orders were created for panel order Urine Drug Screen.  Procedure                               Abnormality         Status                     ---------                               -----------         ------                     Urine Drug Screen Panel[[id number]]      Abnormal            Final result                 Please view results for these tests on the individual orders.   Urine Drug Screen Panel   Result Value Ref Range    Amphetamines Urine Screen Positive (A) Screen Negative    Barbituates Urine Screen Negative Screen Negative    Benzodiazepine Urine Screen Negative Screen Negative    Cannabinoids Urine Screen Negative Screen Negative    Cocaine Urine Screen Negative Screen Negative    Fentanyl Qual Urine Screen Negative Screen Negative    Opiates Urine Screen Negative Screen Negative    PCP Urine Screen Negative Screen Negative       Medications   bisacodyl (DULCOLAX) suppository 10 mg (10 mg Rectal $Given 6/16/24 0315)   sodium phosphate (FLEET ENEMA) 1 enema (1 enema Rectal $Given 6/16/24 0352)       Assessments & Plan (with Medical Decision Making)   Constipation  After receiving bisacodyl supp and enema, had bowel movement and felt much better  I advised oral hydration at home, stool softer, return to ER if symptoms go worse  He understood and agreed.   I have reviewed the nursing notes.    I have reviewed the findings, diagnosis, plan and need for  follow up with the patient.        New Prescriptions    BISACODYL (DULCOLAX) 10 MG SUPPOSITORY    Place 1 suppository (10 mg) rectally daily as needed for constipation       Final diagnoses:   Constipation, unspecified constipation type       6/16/2024   [facility]       Navjot Jensen MD  06/16/24 0508

## 2024-07-07 ENCOUNTER — HEALTH MAINTENANCE LETTER (OUTPATIENT)
Age: 49
End: 2024-07-07

## 2024-08-04 ENCOUNTER — HOSPITAL ENCOUNTER (EMERGENCY)
Facility: HOSPITAL | Age: 49
Discharge: HOME | End: 2024-08-05
Attending: EMERGENCY MEDICINE | Admitting: EMERGENCY MEDICINE

## 2024-08-04 ENCOUNTER — APPOINTMENT (OUTPATIENT)
Dept: CT IMAGING | Facility: HOSPITAL | Age: 49
End: 2024-08-04
Attending: EMERGENCY MEDICINE

## 2024-08-04 VITALS
TEMPERATURE: 97.8 F | HEART RATE: 73 BPM | OXYGEN SATURATION: 99 % | SYSTOLIC BLOOD PRESSURE: 152 MMHG | RESPIRATION RATE: 20 BRPM | DIASTOLIC BLOOD PRESSURE: 100 MMHG

## 2024-08-04 DIAGNOSIS — R10.9 BILATERAL FLANK PAIN: ICD-10-CM

## 2024-08-04 LAB
ALBUMIN SERPL BCG-MCNC: 4.3 G/DL (ref 3.5–5.2)
ALBUMIN UR-MCNC: 20 MG/DL
ALP SERPL-CCNC: 104 U/L (ref 40–150)
ALT SERPL W P-5'-P-CCNC: 14 U/L (ref 0–70)
ANION GAP SERPL CALCULATED.3IONS-SCNC: 12 MMOL/L (ref 7–15)
APPEARANCE UR: CLEAR
AST SERPL W P-5'-P-CCNC: 20 U/L (ref 0–45)
BILIRUB DIRECT SERPL-MCNC: <0.2 MG/DL (ref 0–0.3)
BILIRUB SERPL-MCNC: 0.7 MG/DL
BILIRUB UR QL STRIP: NEGATIVE
BUN SERPL-MCNC: 11.3 MG/DL (ref 6–20)
CALCIUM SERPL-MCNC: 9.6 MG/DL (ref 8.8–10.4)
CHLORIDE SERPL-SCNC: 99 MMOL/L (ref 98–107)
COLOR UR AUTO: YELLOW
CREAT SERPL-MCNC: 0.74 MG/DL (ref 0.67–1.17)
EGFRCR SERPLBLD CKD-EPI 2021: >90 ML/MIN/1.73M2
ERYTHROCYTE [DISTWIDTH] IN BLOOD BY AUTOMATED COUNT: 14.3 % (ref 10–15)
GLUCOSE SERPL-MCNC: 101 MG/DL (ref 70–99)
GLUCOSE UR STRIP-MCNC: NEGATIVE MG/DL
HCO3 SERPL-SCNC: 24 MMOL/L (ref 22–29)
HCT VFR BLD AUTO: 44.8 % (ref 40–53)
HGB BLD-MCNC: 14.6 G/DL (ref 13.3–17.7)
HGB UR QL STRIP: NEGATIVE
HOLD SPECIMEN: NORMAL
HOLD SPECIMEN: NORMAL
KETONES UR STRIP-MCNC: 10 MG/DL
LEUKOCYTE ESTERASE UR QL STRIP: NEGATIVE
LIPASE SERPL-CCNC: 8 U/L (ref 13–60)
MCH RBC QN AUTO: 27.9 PG (ref 26.5–33)
MCHC RBC AUTO-ENTMCNC: 32.6 G/DL (ref 31.5–36.5)
MCV RBC AUTO: 86 FL (ref 78–100)
MUCOUS THREADS #/AREA URNS LPF: PRESENT /LPF
NITRATE UR QL: NEGATIVE
PH UR STRIP: 7.5 [PH] (ref 4.7–8)
PLATELET # BLD AUTO: 320 10E3/UL (ref 150–450)
POTASSIUM SERPL-SCNC: 4.1 MMOL/L (ref 3.4–5.3)
PROT SERPL-MCNC: 7.8 G/DL (ref 6.4–8.3)
RBC # BLD AUTO: 5.23 10E6/UL (ref 4.4–5.9)
RBC URINE: 1 /HPF
SODIUM SERPL-SCNC: 135 MMOL/L (ref 135–145)
SP GR UR STRIP: 1.02 (ref 1–1.03)
SQUAMOUS EPITHELIAL: 0 /HPF
UROBILINOGEN UR STRIP-MCNC: NORMAL MG/DL
WBC # BLD AUTO: 10.9 10E3/UL (ref 4–11)
WBC URINE: 9 /HPF

## 2024-08-04 PROCEDURE — 80053 COMPREHEN METABOLIC PANEL: CPT | Performed by: NURSE PRACTITIONER

## 2024-08-04 PROCEDURE — 99285 EMERGENCY DEPT VISIT HI MDM: CPT | Mod: 25

## 2024-08-04 PROCEDURE — 74176 CT ABD & PELVIS W/O CONTRAST: CPT

## 2024-08-04 PROCEDURE — 36415 COLL VENOUS BLD VENIPUNCTURE: CPT | Performed by: NURSE PRACTITIONER

## 2024-08-04 PROCEDURE — 85027 COMPLETE CBC AUTOMATED: CPT | Performed by: NURSE PRACTITIONER

## 2024-08-04 PROCEDURE — 258N000003 HC RX IP 258 OP 636: Performed by: EMERGENCY MEDICINE

## 2024-08-04 PROCEDURE — 96361 HYDRATE IV INFUSION ADD-ON: CPT

## 2024-08-04 PROCEDURE — 96374 THER/PROPH/DIAG INJ IV PUSH: CPT

## 2024-08-04 PROCEDURE — 83690 ASSAY OF LIPASE: CPT | Performed by: NURSE PRACTITIONER

## 2024-08-04 PROCEDURE — 99284 EMERGENCY DEPT VISIT MOD MDM: CPT | Performed by: EMERGENCY MEDICINE

## 2024-08-04 PROCEDURE — 81001 URINALYSIS AUTO W/SCOPE: CPT | Performed by: EMERGENCY MEDICINE

## 2024-08-04 PROCEDURE — 250N000011 HC RX IP 250 OP 636: Performed by: EMERGENCY MEDICINE

## 2024-08-04 RX ORDER — HYDROMORPHONE HYDROCHLORIDE 1 MG/ML
0.5 INJECTION, SOLUTION INTRAMUSCULAR; INTRAVENOUS; SUBCUTANEOUS ONCE
Status: COMPLETED | OUTPATIENT
Start: 2024-08-04 | End: 2024-08-04

## 2024-08-04 RX ADMIN — HYDROMORPHONE HYDROCHLORIDE 0.5 MG: 1 INJECTION, SOLUTION INTRAMUSCULAR; INTRAVENOUS; SUBCUTANEOUS at 21:59

## 2024-08-04 RX ADMIN — SODIUM CHLORIDE 1000 ML: 9 INJECTION, SOLUTION INTRAVENOUS at 21:59

## 2024-08-04 ASSESSMENT — ACTIVITIES OF DAILY LIVING (ADL)
ADLS_ACUITY_SCORE: 35
ADLS_ACUITY_SCORE: 35

## 2024-08-04 ASSESSMENT — ENCOUNTER SYMPTOMS
FEVER: 0
NAUSEA: 0
ABDOMINAL PAIN: 1
CHILLS: 0

## 2024-08-05 NOTE — DISCHARGE INSTRUCTIONS
Your CT scan was negative for apparent kidney stone. There is stool in the colon but no obstruction and no obvious constipation. There is no life-threatening condition.

## 2024-08-05 NOTE — ED PROVIDER NOTES
History     Chief Complaint   Patient presents with    Flank Pain    Back Pain     HPI  Brijesh Flynn is a 49 year old male who presents with chief complaint of flank pain and back pain.  He localizes pain to the right lower quadrant at this time, relating that is where the pain is worse.  He does relate that he has Had kidney stones in the past.  He has not been able to keep up with his usual intake of water and feels he has been dehydrated.  He has had issues with constipation before but relates he had a normal bowel movement yesterday and does not feel as though he is constipated.    Allergies:  Allergies   Allergen Reactions    Keflex [Cephalexin Hcl]        Problem List:    Patient Active Problem List    Diagnosis Date Noted    ACP (advance care planning) 11/28/2016     Priority: Medium     Advance Care Planning 11/28/2016: ACP Review of Chart / Resources Provided:  Reviewed chart for advance care plan.  Brijesh Flynn has no plan or code status on file. Discussed available resources and provided with information. Confirmed code status reflects current choices pending further ACP discussions.  Confirmed/documented legally designated decision makers.  Added by Trinidad Celeste            Essential hypertension 06/20/2013     Priority: Medium     Overview:   IMO Update          Past Medical History:    No past medical history on file.    Past Surgical History:    No past surgical history on file.    Family History:    Family History   Family history unknown: Yes       Social History:  Marital Status:  Single [1]  Social History     Tobacco Use    Smoking status: Every Day     Current packs/day: 0.50     Types: Cigarettes, Vaping Device    Smokeless tobacco: Never   Substance Use Topics    Alcohol use: No     Alcohol/week: 0.0 standard drinks of alcohol    Drug use: Not Currently        Medications:    bisacodyl (DULCOLAX) 10 MG suppository  methocarbamol (ROBAXIN) 500 MG tablet  SENNA-docusate sodium (SENNA  S) 8.6-50 MG tablet          Review of Systems   Constitutional:  Negative for chills and fever.   Gastrointestinal:  Positive for abdominal pain. Negative for nausea.       Physical Exam   BP: (!) 167/122  Pulse: 100  Temp: 97.8  F (36.6  C)  Resp: 20  SpO2: 97 %      Physical Exam  Constitutional:       General: He is not in acute distress.     Appearance: He is well-developed. He is not diaphoretic.   HENT:      Head: Normocephalic and atraumatic.      Nose: Nose normal. No congestion.      Mouth/Throat:      Mouth: Mucous membranes are moist.      Pharynx: Oropharynx is clear.   Eyes:      General: No scleral icterus.     Extraocular Movements: Extraocular movements intact.      Conjunctiva/sclera: Conjunctivae normal.      Pupils: Pupils are equal, round, and reactive to light.   Cardiovascular:      Rate and Rhythm: Normal rate.   Pulmonary:      Effort: Pulmonary effort is normal.   Abdominal:      General: Abdomen is flat.   Musculoskeletal:         General: No tenderness or deformity. Normal range of motion.      Cervical back: Normal range of motion and neck supple.   Lymphadenopathy:      Cervical: No cervical adenopathy.   Skin:     General: Skin is warm and dry.      Capillary Refill: Capillary refill takes less than 2 seconds.      Findings: No rash.   Neurological:      General: No focal deficit present.      Mental Status: He is alert and oriented to person, place, and time.      Cranial Nerves: No cranial nerve deficit.      Sensory: No sensory deficit.      Coordination: Coordination normal.      Comments: 2+ patellar reflexes bilaterally, no difference in sensation left leg versus right leg.         ED Course     ED Course as of 08/05/24 0403   Sun Aug 04, 2024   1951 Labs ordered while patient in lobby.     Procedures            Results for orders placed or performed during the hospital encounter of 08/04/24 (from the past 24 hour(s))   Hepatic function panel   Result Value Ref Range    Protein  Total 7.8 6.4 - 8.3 g/dL    Albumin 4.3 3.5 - 5.2 g/dL    Bilirubin Total 0.7 <=1.2 mg/dL    Alkaline Phosphatase 104 40 - 150 U/L    AST 20 0 - 45 U/L    ALT 14 0 - 70 U/L    Bilirubin Direct <0.20 0.00 - 0.30 mg/dL   Basic metabolic panel   Result Value Ref Range    Sodium 135 135 - 145 mmol/L    Potassium 4.1 3.4 - 5.3 mmol/L    Chloride 99 98 - 107 mmol/L    Carbon Dioxide (CO2) 24 22 - 29 mmol/L    Anion Gap 12 7 - 15 mmol/L    Urea Nitrogen 11.3 6.0 - 20.0 mg/dL    Creatinine 0.74 0.67 - 1.17 mg/dL    GFR Estimate >90 >60 mL/min/1.73m2    Calcium 9.6 8.8 - 10.4 mg/dL    Glucose 101 (H) 70 - 99 mg/dL   Lipase   Result Value Ref Range    Lipase 8 (L) 13 - 60 U/L   CBC with platelets   Result Value Ref Range    WBC Count 10.9 4.0 - 11.0 10e3/uL    RBC Count 5.23 4.40 - 5.90 10e6/uL    Hemoglobin 14.6 13.3 - 17.7 g/dL    Hematocrit 44.8 40.0 - 53.0 %    MCV 86 78 - 100 fL    MCH 27.9 26.5 - 33.0 pg    MCHC 32.6 31.5 - 36.5 g/dL    RDW 14.3 10.0 - 15.0 %    Platelet Count 320 150 - 450 10e3/uL   Extra Tube    Narrative    The following orders were created for panel order Extra Tube.  Procedure                               Abnormality         Status                     ---------                               -----------         ------                     Extra Blue Top Tube[[id number]]                              Final result               Extra Red Top Tube[[id number]]                               Final result                 Please view results for these tests on the individual orders.   Extra Blue Top Tube   Result Value Ref Range    Hold Specimen JIC    Extra Red Top Tube   Result Value Ref Range    Hold Specimen JIC    UA with Microscopic reflex to Culture    Specimen: Urine, Midstream   Result Value Ref Range    Color Urine Yellow Colorless, Straw, Light Yellow, Yellow    Appearance Urine Clear Clear    Glucose Urine Negative Negative mg/dL    Bilirubin Urine Negative Negative    Ketones Urine 10 (A) Negative  mg/dL    Specific Gravity Urine 1.024 1.003 - 1.035    Blood Urine Negative Negative    pH Urine 7.5 4.7 - 8.0    Protein Albumin Urine 20 (A) Negative mg/dL    Urobilinogen Urine Normal Normal, 2.0 mg/dL    Nitrite Urine Negative Negative    Leukocyte Esterase Urine Negative Negative    Mucus Urine Present (A) None Seen /LPF    RBC Urine 1 <=2 /HPF    WBC Urine 9 (H) <=5 /HPF    Squamous Epithelials Urine 0 <=1 /HPF    Narrative    Urine Culture not indicated       Medications   sodium chloride 0.9% BOLUS 1,000 mL (0 mLs Intravenous Stopped 8/4/24 1502)   HYDROmorphone (PF) (DILAUDID) injection 0.5 mg (0.5 mg Intravenous $Given 8/4/24 8950)       Assessments & Plan (with Medical Decision Making)     I have reviewed the nursing notes.    I have reviewed the findings, diagnosis, plan and need for follow up with the patient.    Medical Decision Making  The patient's presentation was of low complexity (an acute and uncomplicated illness or injury).    The patient's evaluation involved:  ordering and/or review of 3+ test(s) in this encounter (see separate area of note for details)    The patient's management necessitated high risk (a parenteral controlled substance).    Labs are reassuring, patient was given IV fluid and pain medication and a CT abdomen pelvis was performed.  CT abdomen pelvis was negative for acute abnormality, though there was a delay in obtaining the films and interviewing them due to PACS downtime.  Patient ultimately was discharged in stable condition.    Discharge Medication List as of 8/4/2024 11:54 PM          Final diagnoses:   Bilateral flank pain       8/4/2024   [facility]       Alejandro Rivera DO  08/05/24 0403

## 2024-08-05 NOTE — ED NOTES
Face to face report given with opportunity to observe patient.    Report given to HU [place] RN   8/4/2024  11:26 PM

## 2024-08-05 NOTE — ED NOTES
Patient reports bilateral flank and back pain that started yesterday. Denies any N/V/D. BM yesterday- normal per patient. Denies any urinary symptoms, denies any fevers, denies any blood in stool or urine.

## 2024-08-05 NOTE — ED TRIAGE NOTES
"Patient presents w/ c/o bilateral flank pain, right side is more painful. Took an Ibuprofen 800mg prior to arrival, \"that helped a little\". Pain goes into his back.   Hx of kidney stones reported.   Denies burning w/ urination.   Denies SOB or chest pain.   A&Ox4.      Triage Assessment (Adult)       Row Name 08/04/24 1937          Triage Assessment    Airway WDL WDL        Respiratory WDL    Respiratory WDL WDL;expansion/retractions;rhythm/pattern     Rhythm/Pattern, Respiratory unlabored;pattern regular;depth regular;no shortness of breath reported     Expansion/Accessory Muscles/Retractions no use of accessory muscles;no retractions;expansion symmetric                     "

## 2025-01-17 ENCOUNTER — HOSPITAL ENCOUNTER (EMERGENCY)
Facility: HOSPITAL | Age: 50
Discharge: LEFT BEFORE DISCHARGE | End: 2025-01-17
Attending: EMERGENCY MEDICINE

## 2025-01-17 VITALS
HEART RATE: 105 BPM | RESPIRATION RATE: 16 BRPM | TEMPERATURE: 97.9 F | SYSTOLIC BLOOD PRESSURE: 193 MMHG | OXYGEN SATURATION: 99 % | DIASTOLIC BLOOD PRESSURE: 108 MMHG

## 2025-01-17 DIAGNOSIS — Z53.21 PATIENT LEFT WITHOUT BEING SEEN: ICD-10-CM

## 2025-01-17 ASSESSMENT — ACTIVITIES OF DAILY LIVING (ADL): ADLS_ACUITY_SCORE: 41

## 2025-01-17 NOTE — Clinical Note
Left without being seen from the lobby. Dr. Gore did assign himself to patient but never actually evaluated patient. Left without being seen prior to completion of care.

## 2025-01-18 NOTE — ED TRIAGE NOTES
"Patient presents with c/o right testicle pain, reports \"on the bottom of it feels not normal, like a hose.\" that has been intermittent and got worse tonight. Denies taking anything for pain. Denies any urinary symptoms.         "

## 2025-01-18 NOTE — ED PROVIDER NOTES
I never saw this patient, they left without being seen from the waiting room.     Cricket Gore MD  01/17/25 [id number]

## 2025-02-04 ENCOUNTER — HOSPITAL ENCOUNTER (OUTPATIENT)
Dept: ULTRASOUND IMAGING | Facility: OTHER | Age: 50
Discharge: HOME | End: 2025-02-04

## 2025-02-04 ENCOUNTER — OFFICE VISIT (OUTPATIENT)
Dept: FAMILY MEDICINE | Facility: OTHER | Age: 50
End: 2025-02-04

## 2025-02-04 VITALS
SYSTOLIC BLOOD PRESSURE: 162 MMHG | DIASTOLIC BLOOD PRESSURE: 100 MMHG | WEIGHT: 178 LBS | HEIGHT: 68 IN | OXYGEN SATURATION: 95 % | TEMPERATURE: 98.2 F | BODY MASS INDEX: 26.98 KG/M2 | HEART RATE: 90 BPM | RESPIRATION RATE: 19 BRPM

## 2025-02-04 DIAGNOSIS — R10.30 LOWER ABDOMINAL PAIN: ICD-10-CM

## 2025-02-04 DIAGNOSIS — N45.1 EPIDIDYMITIS, RIGHT: Primary | ICD-10-CM

## 2025-02-04 DIAGNOSIS — N50.89 TESTICULAR MICROLITHIASIS: ICD-10-CM

## 2025-02-04 DIAGNOSIS — N50.89 MASS OF RIGHT TESTICLE: ICD-10-CM

## 2025-02-04 DIAGNOSIS — I10 ESSENTIAL HYPERTENSION: ICD-10-CM

## 2025-02-04 LAB
ALBUMIN SERPL BCG-MCNC: 4.4 G/DL (ref 3.5–5.2)
ALBUMIN UR-MCNC: 10 MG/DL
ALP SERPL-CCNC: 84 U/L (ref 40–150)
ALT SERPL W P-5'-P-CCNC: 17 U/L (ref 0–70)
AMORPH CRY #/AREA URNS HPF: ABNORMAL /HPF
ANION GAP SERPL CALCULATED.3IONS-SCNC: 11 MMOL/L (ref 7–15)
APPEARANCE UR: ABNORMAL
AST SERPL W P-5'-P-CCNC: 27 U/L (ref 0–45)
BACTERIA #/AREA URNS HPF: ABNORMAL /HPF
BASOPHILS # BLD AUTO: 0 10E3/UL (ref 0–0.2)
BASOPHILS NFR BLD AUTO: 0 %
BILIRUB SERPL-MCNC: 0.3 MG/DL
BILIRUB UR QL STRIP: NEGATIVE
BUN SERPL-MCNC: 15 MG/DL (ref 6–20)
C TRACH DNA SPEC QL PROBE+SIG AMP: NEGATIVE
CALCIUM SERPL-MCNC: 9.4 MG/DL (ref 8.8–10.4)
CHLORIDE SERPL-SCNC: 99 MMOL/L (ref 98–107)
COLOR UR AUTO: ABNORMAL
CREAT SERPL-MCNC: 0.82 MG/DL (ref 0.67–1.17)
CRP SERPL-MCNC: <3 MG/L
EGFRCR SERPLBLD CKD-EPI 2021: >90 ML/MIN/1.73M2
EOSINOPHIL # BLD AUTO: 0.2 10E3/UL (ref 0–0.7)
EOSINOPHIL NFR BLD AUTO: 2 %
ERYTHROCYTE [DISTWIDTH] IN BLOOD BY AUTOMATED COUNT: 14.4 % (ref 10–15)
GLUCOSE SERPL-MCNC: 99 MG/DL (ref 70–99)
GLUCOSE UR STRIP-MCNC: NEGATIVE MG/DL
HCO3 SERPL-SCNC: 28 MMOL/L (ref 22–29)
HCT VFR BLD AUTO: 44.3 % (ref 40–53)
HGB BLD-MCNC: 14.4 G/DL (ref 13.3–17.7)
HGB UR QL STRIP: NEGATIVE
IMM GRANULOCYTES # BLD: 0 10E3/UL
IMM GRANULOCYTES NFR BLD: 0 %
KETONES UR STRIP-MCNC: NEGATIVE MG/DL
LEUKOCYTE ESTERASE UR QL STRIP: NEGATIVE
LYMPHOCYTES # BLD AUTO: 2.2 10E3/UL (ref 0.8–5.3)
LYMPHOCYTES NFR BLD AUTO: 26 %
MCH RBC QN AUTO: 28.3 PG (ref 26.5–33)
MCHC RBC AUTO-ENTMCNC: 32.5 G/DL (ref 31.5–36.5)
MCV RBC AUTO: 87 FL (ref 78–100)
MONOCYTES # BLD AUTO: 0.6 10E3/UL (ref 0–1.3)
MONOCYTES NFR BLD AUTO: 7 %
MUCOUS THREADS #/AREA URNS LPF: PRESENT /LPF
N GONORRHOEA DNA SPEC QL NAA+PROBE: NEGATIVE
NEUTROPHILS # BLD AUTO: 5.3 10E3/UL (ref 1.6–8.3)
NEUTROPHILS NFR BLD AUTO: 63 %
NITRATE UR QL: NEGATIVE
NRBC # BLD AUTO: 0 10E3/UL
NRBC BLD AUTO-RTO: 0 /100
PH UR STRIP: 7.5 [PH] (ref 5–9)
PLATELET # BLD AUTO: 351 10E3/UL (ref 150–450)
POTASSIUM SERPL-SCNC: 3.7 MMOL/L (ref 3.4–5.3)
PROT SERPL-MCNC: 7.7 G/DL (ref 6.4–8.3)
RBC # BLD AUTO: 5.08 10E6/UL (ref 4.4–5.9)
RBC URINE: 4 /HPF
SODIUM SERPL-SCNC: 138 MMOL/L (ref 135–145)
SP GR UR STRIP: 1.02 (ref 1–1.03)
SPECIMEN TYPE: NORMAL
UROBILINOGEN UR STRIP-MCNC: NORMAL MG/DL
WBC # BLD AUTO: 8.4 10E3/UL (ref 4–11)
WBC URINE: 5 /HPF

## 2025-02-04 PROCEDURE — 96372 THER/PROPH/DIAG INJ SC/IM: CPT

## 2025-02-04 PROCEDURE — 84460 ALANINE AMINO (ALT) (SGPT): CPT | Mod: ZL

## 2025-02-04 PROCEDURE — 87491 CHLMYD TRACH DNA AMP PROBE: CPT | Mod: ZL

## 2025-02-04 PROCEDURE — 85048 AUTOMATED LEUKOCYTE COUNT: CPT | Mod: ZL

## 2025-02-04 PROCEDURE — 86140 C-REACTIVE PROTEIN: CPT | Mod: ZL

## 2025-02-04 PROCEDURE — 81001 URINALYSIS AUTO W/SCOPE: CPT | Mod: ZL

## 2025-02-04 PROCEDURE — 85004 AUTOMATED DIFF WBC COUNT: CPT | Mod: ZL

## 2025-02-04 PROCEDURE — 250N000011 HC RX IP 250 OP 636: Mod: JZ

## 2025-02-04 PROCEDURE — 93976 VASCULAR STUDY: CPT

## 2025-02-04 PROCEDURE — 36415 COLL VENOUS BLD VENIPUNCTURE: CPT | Mod: ZL

## 2025-02-04 PROCEDURE — 86780 TREPONEMA PALLIDUM: CPT | Mod: ZL

## 2025-02-04 RX ORDER — DOXYCYCLINE 100 MG/1
100 CAPSULE ORAL 2 TIMES DAILY
Qty: 20 CAPSULE | Refills: 0 | Status: SHIPPED | OUTPATIENT
Start: 2025-02-04 | End: 2025-02-14

## 2025-02-04 RX ORDER — CEFTRIAXONE SODIUM 1 G
500 VIAL (EA) INJECTION ONCE
Status: COMPLETED | OUTPATIENT
Start: 2025-02-04 | End: 2025-02-04

## 2025-02-04 RX ADMIN — CEFTRIAXONE SODIUM 500 MG: 500 INJECTION, POWDER, FOR SOLUTION INTRAMUSCULAR; INTRAVENOUS at 13:59

## 2025-02-04 ASSESSMENT — ANXIETY QUESTIONNAIRES
5. BEING SO RESTLESS THAT IT IS HARD TO SIT STILL: NOT AT ALL
2. NOT BEING ABLE TO STOP OR CONTROL WORRYING: NOT AT ALL
3. WORRYING TOO MUCH ABOUT DIFFERENT THINGS: NOT AT ALL
6. BECOMING EASILY ANNOYED OR IRRITABLE: NOT AT ALL
GAD7 TOTAL SCORE: 0
GAD7 TOTAL SCORE: 0
1. FEELING NERVOUS, ANXIOUS, OR ON EDGE: NOT AT ALL
7. FEELING AFRAID AS IF SOMETHING AWFUL MIGHT HAPPEN: NOT AT ALL
4. TROUBLE RELAXING: NOT AT ALL
IF YOU CHECKED OFF ANY PROBLEMS ON THIS QUESTIONNAIRE, HOW DIFFICULT HAVE THESE PROBLEMS MADE IT FOR YOU TO DO YOUR WORK, TAKE CARE OF THINGS AT HOME, OR GET ALONG WITH OTHER PEOPLE: NOT DIFFICULT AT ALL

## 2025-02-04 ASSESSMENT — PAIN SCALES - GENERAL: PAINLEVEL_OUTOF10: MODERATE PAIN (5)

## 2025-02-04 NOTE — NURSING NOTE
"Chief Complaint   Patient presents with    Abdominal Pain    Testicular Problem    Hematuria     Patient here for cramping and pain in lower abdomen off and on for awhile. He also notes a growth or abnormality on right testicle and blood in urine.     Initial BP (!) 162/100   Pulse 90   Temp 98.2  F (36.8  C) (Tympanic)   Resp 19   Ht 1.727 m (5' 8\")   Wt 80.7 kg (178 lb)   SpO2 95%   BMI 27.06 kg/m   Estimated body mass index is 27.06 kg/m  as calculated from the following:    Height as of this encounter: 1.727 m (5' 8\").    Weight as of this encounter: 80.7 kg (178 lb).  Medication Review: complete    The next two questions are to help us understand your food security.  If you are feeling you need any assistance in this area, we have resources available to support you today.          2/4/2025   SDOH- Food Insecurity   Within the past 12 months, did you worry that your food would run out before you got money to buy more? N   Within the past 12 months, did the food you bought just not last and you didn t have money to get more? N         Health Care Directive:  Patient does not have a Health Care Directive: Discussed advance care planning with patient; however, patient declined at this time.    Cherie Kaufman LPN      "

## 2025-02-04 NOTE — PROGRESS NOTES
ASSESSMENT/PLAN:    I have reviewed the nursing notes.  I have reviewed the findings, diagnosis, plan and need for follow up with the patient.    1. Epididymitis, right (Primary)  2. Mass of right testicle  3. Lower abdominal pain  4. Testicular microlithiasis  - UA with Microscopic reflex to Culture  - GC/Chlamydia by PCR  - US Testicular & Scrotum w Doppler Ltd  - Treponema Ab w Reflex to RPR and Titer  - CRP inflammation  - Comprehensive Metabolic Panel  - CBC and Differential  - doxycycline hyclate (VIBRAMYCIN) 100 MG capsule; Take 1 capsule (100 mg) by mouth 2 times daily for 10 days.  Dispense: 20 capsule; Refill: 0  - cefTRIAXone (ROCEPHIN) in lidocaine 1% for IM administration only 500 mg    Patient presents with lower abdominal pain and a mass of his right testicle.  Labs are stable with no concerning abnormalities.  Urinalysis was negative for any signs of infection.  GC/chlamydia was negative.  Syphilis testing is pending.  Testicular ultrasound indicates right epididymitis and testicular microlithiasis.  Discussed results with patient in clinic.  Will treat his epididymitis with a Rocephin injection in clinic and oral doxycycline.  Discussed care instructions with patient and possible causes of epididymitis.  May take Tylenol and ibuprofen as needed.    5. Essential hypertension    Patient has a history of hypertension.  BP today is 162/100.  He is not currently on any blood pressure medications.  Advised patient that he should follow-up with his PCP regarding his hypertension.    Discussed warning signs/symptoms indicative of need to f/u    Follow up if symptoms persist or worsen or concerns    I explained my diagnostic considerations and recommendations to the patient, who voiced understanding and agreement with the treatment plan. All questions were answered. We discussed potential side effects of any prescribed or recommended therapies, as well as expectations for response to treatments.    Noel NAVARRETE  OMAR Casarez CNP  2/4/2025  11:59 AM    HPI:    Brijesh Flynn is a 49 year old male  who presents to [facility] today for concerns of abdominal pain    ABDOMINAL PAIN (and/or Flank Pain)  Onset: 2 week(s) ago  Description:   Location: lower abdomen  Radiation: Pelvic region and Scrotum  Character: Dull ache  Frequency (if intermittent):        Pain-free between episodes: no  History:    Previous similar pain? No   Previous tests done? none  Any related trauma?: no  Accompanying Signs & Symptoms:   Fever? No  Nausea No  Vomiting (bloody?, coffee-grounds?) no  Dysuria? No  Hematuria: YES- gross hematuria 2 days ago  Change in stool color: no  Change in stool pattern: No  Weight loss: No  Patient states that he also noted a small lump on his right testicle that he noticed about 2 weeks ago, he states there is occasional tenderness but denies swelling, he may have been exposed to an STD  Precipitating and/or Alleviating factors:   Does the pain change with: Food No                                                BM No                                                Body movement YES- worse with movement                                                 Urination No  Therapies tried and outcome:  none                History reviewed. No pertinent past medical history.  History reviewed. No pertinent surgical history.  Social History     Tobacco Use    Smoking status: Every Day     Current packs/day: 0.50     Types: Cigarettes    Smokeless tobacco: Never   Substance Use Topics    Alcohol use: No     Alcohol/week: 0.0 standard drinks of alcohol     Current Outpatient Medications   Medication Sig Dispense Refill    bisacodyl (DULCOLAX) 10 MG suppository Place 10 mg rectally daily as needed for constipation (Patient not taking: Reported on 2/4/2025)      methocarbamol (ROBAXIN) 500 MG tablet Take 1 tablet (500 mg) by mouth 4 times daily as needed for muscle spasms (Patient not taking: Reported on 2/4/2025) 40 tablet 0  "   SENNA-docusate sodium (SENNA S) 8.6-50 MG tablet Take 1 tablet by mouth 2 times daily (Patient not taking: Reported on 2/4/2025) 30 tablet 0     Allergies   Allergen Reactions    Keflex [Cephalexin Hcl]      Past medical history, past surgical history, current medications and allergies reviewed and accurate to the best of my knowledge.      ROS:  Refer to HPI    BP (!) 162/100   Pulse 90   Temp 98.2  F (36.8  C) (Tympanic)   Resp 19   Ht 1.727 m (5' 8\")   Wt 80.7 kg (178 lb)   SpO2 95%   BMI 27.06 kg/m      EXAM:  General Appearance: Well appearing 49 year old male, appropriate appearance for age. No acute distress   Oropharynx: moist mucous membranes, posterior pharynx without erythema, tonsils symmetric and 1+, no erythema, no exudates or petechiae, no post nasal drip seen, no trismus, voice clear.    Nose:  Bilateral nares: no erythema, no edema, no drainage or congestion   Neck: supple without adenopathy  Respiratory: normal chest wall and respirations.  Normal effort.  Clear to auscultation bilaterally, no wheezing, crackles or rhonchi.  No increased work of breathing.  No cough appreciated.  Cardiac: RRR with no murmurs  Abdomen: soft, mild generalized tenderness of lower abdomen, no rigidity, no rebound tenderness or guarding, normal bowel sounds present  :  No suprapubic tenderness to palpation.  Absent CVA tenderness to palpation.  Chaperone present during exam.  Small pea-sized mass palpated on left testicle, no scrotal swelling noted, no tenderness with palpation, no discoloration noted of scrotum  Musculoskeletal:  Equal movement of bilateral upper extremities.  Equal movement of bilateral lower extremities.  Normal gait.    Dermatological: no rashes noted of exposed skin  Neuro: Alert and oriented to person, place, and time.  Cranial nerves II-XII grossly intact with no focal or lateralizing deficits.  Muscle tone normal.  Gait normal. No tremor.   Psychological: normal affect, alert, " oriented, and pleasant.     Labs & US:  Results for orders placed or performed in visit on 02/04/25   US Testicular & Scrotum w Doppler Ltd     Status: None    Narrative    PROCEDURE: US TESTICULAR AND SCROTUM WITH DOPPLER LIMITED 2/4/2025  1:20 PM    HISTORY: right testicular mass; Mass of right testicle; Lower  abdominal pain    COMPARISONS: None.    TECHNIQUE: Ultrasound of the testes with color flow Doppler and  spectral analysis    FINDINGS: The right testis measures 4.5 x 3.3 x 2.2 cm. The left  testis measures 5 x 1.6 x 2.2 cm. There are multiple echogenic foci in  the testes bilaterally consistent with testicular microlithiasis. No  testicular masses are seen.    There is enlargement of the epididymis on the right with increased  blood flow consistent with epididymitis.    There is no evidence of testicular torsion.         Impression    IMPRESSION: Right sided epididymitis.    Testicular microlithiasis    GWENDOLYN NARANJO MD         SYSTEM ID:  [id number]   UA with Microscopic reflex to Culture     Status: Abnormal    Specimen: Urine, Clean Catch   Result Value Ref Range    Color Urine Light Yellow Colorless, Straw, Light Yellow, Yellow    Appearance Urine Slightly Cloudy (A) Clear    Glucose Urine Negative Negative mg/dL    Bilirubin Urine Negative Negative    Ketones Urine Negative Negative mg/dL    Specific Gravity Urine 1.024 1.000 - 1.030    Blood Urine Negative Negative    pH Urine 7.5 5.0 - 9.0    Protein Albumin Urine 10 (A) Negative mg/dL    Urobilinogen Urine Normal Normal, 2.0 mg/dL    Nitrite Urine Negative Negative    Leukocyte Esterase Urine Negative Negative    Bacteria Urine Few (A) None Seen /HPF    Mucus Urine Present (A) None Seen /LPF    Amorphous Crystals Urine Few (A) None Seen /HPF    RBC Urine 4 (H) <=2 /HPF    WBC Urine 5 <=5 /HPF    Narrative    Urine Culture not indicated   CRP inflammation     Status: Normal   Result Value Ref Range    CRP Inflammation <3.00 <5.00 mg/L    Comprehensive Metabolic Panel     Status: Normal   Result Value Ref Range    Sodium 138 135 - 145 mmol/L    Potassium 3.7 3.4 - 5.3 mmol/L    Carbon Dioxide (CO2) 28 22 - 29 mmol/L    Anion Gap 11 7 - 15 mmol/L    Urea Nitrogen 15.0 6.0 - 20.0 mg/dL    Creatinine 0.82 0.67 - 1.17 mg/dL    GFR Estimate >90 >60 mL/min/1.73m2    Calcium 9.4 8.8 - 10.4 mg/dL    Chloride 99 98 - 107 mmol/L    Glucose 99 70 - 99 mg/dL    Alkaline Phosphatase 84 40 - 150 U/L    AST 27 0 - 45 U/L    ALT 17 0 - 70 U/L    Protein Total 7.7 6.4 - 8.3 g/dL    Albumin 4.4 3.5 - 5.2 g/dL    Bilirubin Total 0.3 <=1.2 mg/dL   CBC with platelets and differential     Status: None   Result Value Ref Range    WBC Count 8.4 4.0 - 11.0 10e3/uL    RBC Count 5.08 4.40 - 5.90 10e6/uL    Hemoglobin 14.4 13.3 - 17.7 g/dL    Hematocrit 44.3 40.0 - 53.0 %    MCV 87 78 - 100 fL    MCH 28.3 26.5 - 33.0 pg    MCHC 32.5 31.5 - 36.5 g/dL    RDW 14.4 10.0 - 15.0 %    Platelet Count 351 150 - 450 10e3/uL    % Neutrophils 63 %    % Lymphocytes 26 %    % Monocytes 7 %    % Eosinophils 2 %    % Basophils 0 %    % Immature Granulocytes 0 %    NRBCs per 100 WBC 0 <1 /100    Absolute Neutrophils 5.3 1.6 - 8.3 10e3/uL    Absolute Lymphocytes 2.2 0.8 - 5.3 10e3/uL    Absolute Monocytes 0.6 0.0 - 1.3 10e3/uL    Absolute Eosinophils 0.2 0.0 - 0.7 10e3/uL    Absolute Basophils 0.0 0.0 - 0.2 10e3/uL    Absolute Immature Granulocytes 0.0 <=0.4 10e3/uL    Absolute NRBCs 0.0 10e3/uL   GC/Chlamydia by PCR     Status: None    Specimen: Urine, Voided   Result Value Ref Range    Chlamydia Trachomatis Negative Negative    Neisseria gonorrhoeae Negative Negative    CTNG Specimen Source Urine, Voided     Narrative    Assay performed using [employer] App real-time, reverse-transcriptase PCR.   CBC and Differential     Status: None    Narrative    The following orders were created for panel order CBC and Differential.  Procedure                               Abnormality         Status                      ---------                               -----------         ------                     CBC with platelets and d...[[id number]]                      Final result                 Please view results for these tests on the individual orders.

## 2025-02-05 LAB — T PALLIDUM AB SER QL: NONREACTIVE

## 2025-02-26 ENCOUNTER — TELEPHONE (OUTPATIENT)
Dept: FAMILY MEDICINE | Facility: OTHER | Age: 50
End: 2025-02-26

## 2025-02-26 NOTE — TELEPHONE ENCOUNTER
4:52 PM    Reason for Call: OVERBOOK    Patient is having the following symptoms: needs to est/for suboxone.    The patient is requesting an appointment for asap with Dr Ornelas.    Was an appointment offered for this call? No  If yes : Appointment type              Date    Preferred method for responding to this message: Telephone Call  What is your phone number ?[phone]     If we cannot reach you directly, may we leave a detailed response at the number you provided? Yes    Can this message wait until your PCP/provider returns, if unavailable today? Not applicable    Deja Reaves

## 2025-02-27 NOTE — TELEPHONE ENCOUNTER
"Clinic Care Coordination Contact  Care Team Conversations    RN CC placed outgoing phone call to Brijesh this date.  Brijesh is not new to [place] - has been on Methadone and Suboxone in the past.  Hasn't been prescribed Suboxone in quite some time -but has been taking it off the street for the past few years - gets tabs or films - \"whatever is available\".  Hasn't had any in 1-2 days.  Would like an appointment as soon as possible for JAYLIN.  RN CC did schedule him an appointment with Dr. Ornelas for Friday, February 28, arrive at 12:45PM.  Did inform him that he will go to lab first for a UDS - not punitive in anyway - it is part of our program, which he understand.  RN CC did receive verbal consent to text him appointment date, time, etc.  Task complete.  Did recently have blood work completed at [facility] - 2/4/2025.  Denied need for Hep C/HIV testing -these were completed in 2023 -  both negative.    Mayda Coto RN-BSN, [facility] Care Coordinator  [phone]        "

## 2025-02-27 NOTE — PROGRESS NOTES
"  Assessment & Plan     Opioid use disorder  Longstanding history Intermittently on MOUD.  Has been self treating with Suboxone off the streets for the past 7 years without other opioid use.  Absolutely would benefit from consistent dosing and supply, will restart Suboxone for harm reduction, improvement of mood, withdrawal symptoms, and cravings.  Short interval follow-up.  - Visit in conjunction with RN CC; appreciate assistance  - MAT contract signed 2/28/2025  - PDMP reviewed  - Urine Drug Screen Buprenorphine Urine Qualitative [id number], Ethanol Urine Qualitative [id number], Methadone Urine Qualitative [id number], Oxycodone Urine Qualitative [id number], Creatinine Urine Random [id number]  - buprenorphine-naloxone (SUBOXONE) 8-2 MG SUBL sublingual tablet; Place 1 tablet under the tongue daily.  - Follow-up 3/14/2025    Methamphetamine dependence (H)  Longstanding, last use yesterday.  Typically able to abstain from stimulants if on consistent Suboxone dosing.  Desires sobriety.  Suboxone as above, continue to support as able.  - Could consider trial of Wellbutrin, Remeron    Essential hypertension  Longstanding uncontrolled hypertension.  Exacerbated by chronic stimulant use, tobacco smoking. Given chronicity and severity, recommended starting antihypertensive therapy today with short interval follow-up.  - lisinopril-hydrochlorothiazide (ZESTORETIC) 10-12.5 MG tablet; Take 1 tablet by mouth daily.  - Metabolic panel at follow-up    Tobacco dependence due to cigarettes    Tobacco Use      Smoking status: Every Day        Packs/day: 0.50        Types: Cigarettes      Smokeless tobacco: Never    Nicotine/Tobacco Cessation  He reports that he has been smoking cigarettes. He has never used smokeless tobacco.  Nicotine/Tobacco Cessation Plan  Information offered: Patient not interested at this time      BMI  Estimated body mass index is 27.28 kg/m  as calculated from the following:    Height as of this encounter: 1.727 m (5' 8\").    " "Weight as of this encounter: 81.4 kg (179 lb 6.4 oz).   Weight management plan: Discussed healthy diet and exercise guidelines    I spent a total of 40 minutes on the day of the visit.   Time spent by me today doing chart review, history and exam, documentation and further activities per the note    The longitudinal plan of care for the diagnosis(es)/condition(s) as documented were addressed during this visit. Due to the added complexity in care, I will continue to support Brijesh Flynn in the subsequent management and with ongoing continuity of care.    Follow-up 2 weeks.    Maria Del Rosario Coley is a 50 year old, presenting for the following health issues:  Recheck Medication    HPI        Current Narcotic Use/History:  Which opioid(s) are you currently using, that are not already on your med list?: Suboxone - 4-8mg per day - took 4mg this AM  How do you use your drug of choice? NA  What is your estimated total dose (mg if pills, grams of heroin) per day? NA  When did you last use? \"A long time \"  Meth - yesterday - smoke/snort - daily use if he doesn't have Suboxone   Have you ever tried to quit on your own? YES-   What have you done to try quiting in the past? MOUD  What was the longest period of time you have been sober from opioids?:   When and how did you start using opioids? Hurt his ankle and his back in 2002- was prescribed opioids from provider in [place] - once refills ran out - turned to buying \"anything off the street\" - no heroin use - ever   2011 - 2012 - was on Methadone - went to [facility]   Relapsed - after 2012 - went back to buying anything off the street   Was prescribed Suboxone through a provider in the [facility] - around 2016/2017 - was on less than 1 year   Since 2018 - has been buying Suboxone off the street       Other Substance/Psychiatric History:  Have you been struggling with any other mental health symptoms?: irritability, depression   Any other drug use other than opioids?: (!) " PRESCRIPTION DRUGS and (!) AMEPHETAMINES  Do you struggle with any other addictive behaviors (sex, gambling, internet, shopping, TV etc): None    Social History  Housing status: with GF - Nena - good sober support   Employment status: will be starting on April 1 - [employer]  Relationship status: Partnered  Children: 1 child  Legal: None   Insurance needs: pending -  Contact information up to date? Yes       Family History:  Does anyone in your family have a history of a use disorder? no    Opioid Use Disorder Criteria:       2/28/2025     1:27 PM   OUD Results   Opioids are used more/longer than intended Yes   Tried to cut down or control opioid use more than once without success Yes   Significant time spent finding, using, or recovering from opioids Yes   Cravings for opioids Yes   Opioid use leading to major consequences or significant difficulty performing at work, school, or home No   Opioid use creating or worsening problems with social interractions or relationships No   Activities in personal or work life are decreased or stopped due to opioid use No   Opioid use in hazardous situations No   Ongoing opioid use despite problems with physical or mental health that are related to opioid use No   Tolerance - requiring more to get the same effect, or using the same amount and feeling less of a response Yes   Withdrawal - experiencing symptoms consistent with opioid withdrawal Yes   Opioid Use Disorder Total Score 6   Score Interpretation Severe: 6 or more symptoms     PHQ Score:      11/28/2016     1:52 PM 2/28/2025    12:54 PM   PHQ   PHQ-9 Total Score 9 8    Q9: Thoughts of better off dead/self-harm past 2 weeks Not at all Not at all       Patient-reported     GAD7 Score:       2/4/2025    11:53 AM 2/28/2025     1:00 PM   ROSENDA-7 SCORE   Total Score  6 (mild anxiety)   Total Score 0 6        Patient-reported     {  PDMP Review       None            Review of Systems  Constitutional, HEENT, cardiovascular,  "pulmonary, gi and gu systems are negative, except as otherwise noted.      Objective    BP (!) 160/100 (BP Location: Right arm, Patient Position: Sitting, Cuff Size: Adult Regular)   Pulse 113   Temp 97.6  F (36.4  C) (Tympanic)   Ht 1.727 m (5' 8\")   Wt 81.4 kg (179 lb 6.4 oz)   SpO2 94%   BMI 27.28 kg/m    Body mass index is 27.28 kg/m .  Physical Exam  Vitals reviewed.   Constitutional:       Appearance: Normal appearance.   HENT:      Head: Normocephalic and atraumatic.   Cardiovascular:      Rate and Rhythm: Normal rate and regular rhythm.      Heart sounds: No murmur heard.  Pulmonary:      Effort: Pulmonary effort is normal.      Breath sounds: Normal breath sounds. No stridor. No wheezing, rhonchi or rales.   Musculoskeletal:         General: Normal range of motion.   Skin:     General: Skin is warm and dry.   Neurological:      General: No focal deficit present.      Mental Status: He is alert and oriented to person, place, and time.   Psychiatric:         Mood and Affect: Mood normal.         Behavior: Behavior normal.            Results for orders placed or performed in visit on 02/28/25 (from the past 24 hours)   Urine Drug Screen Buprenorphine Urine Qualitative [id number], Ethanol Urine Qualitative [id number], Methadone Urine Qualitative [id number], Oxycodone Urine Qualitative [id number], Creatinine Urine Random [id number]    Narrative    The following orders were created for panel order Urine Drug Screen Buprenorphine Urine Qualitative [id number], Ethanol Urine Qualitative [id number], Methadone Urine Qualitative [id number], Oxycodone Urine Qualitative [id number], Creatinine Urine Random [id number].  Procedure                               Abnormality         Status                     ---------                               -----------         ------                     Urine Drug Screen Panel[[id number]]      Abnormal            Final result               Buprenorphine Urine, Kalyan...[[id number]]  Abnormal            Final result    "            Ethanol urine[[id number]]                Normal              Final result               Methadone Qual Urine[[id number]]         Normal              Final result               Oxycodone Urine, Qualita...[[id number]]  Normal              Final result               Creatinine random urine[[id number]]                          Final result                 Please view results for these tests on the individual orders.   Urine Drug Screen Panel   Result Value Ref Range    Amphetamines Urine Screen Positive (A) Screen Negative    Barbituates Urine Screen Negative Screen Negative    Benzodiazepine Urine Screen Negative Screen Negative    Cannabinoids Urine Screen Negative Screen Negative    Cocaine Urine Screen Negative Screen Negative    Fentanyl Qual Urine Screen Negative Screen Negative    Opiates Urine Screen Negative Screen Negative    PCP Urine Screen Negative Screen Negative   Buprenorphine Urine, Qualitative   Result Value Ref Range    Buprenorphine Qual Urine Screen Positive (A) Screen Negative   Ethanol urine   Result Value Ref Range    Ethanol Urine Screen Negative Screen Negative   Methadone Qual Urine   Result Value Ref Range    Methadone Urine Screen Negative Screen Negative   Oxycodone Urine, Qualitative   Result Value Ref Range    Oxycodone Urine Screen Negative Screen Negative   Creatinine random urine   Result Value Ref Range    Creatinine Urine mg/dL 168.6 mg/dL           Signed Electronically by: Rancho Ornelas MD    Answers submitted by the patient for this visit:  Patient Health Questionnaire (Submitted on 2/28/2025)  If you checked off any problems, how difficult have these problems made it for you to do your work, take care of things at home, or get along with other people?: Somewhat difficult  PHQ9 TOTAL SCORE: 8  Patient Health Questionnaire (G7) (Submitted on 2/28/2025)  ROSENDA 7 TOTAL SCORE: 6

## 2025-02-28 ENCOUNTER — OFFICE VISIT (OUTPATIENT)
Dept: FAMILY MEDICINE | Facility: OTHER | Age: 50
End: 2025-02-28
Attending: STUDENT IN AN ORGANIZED HEALTH CARE EDUCATION/TRAINING PROGRAM

## 2025-02-28 ENCOUNTER — APPOINTMENT (OUTPATIENT)
Dept: LAB | Facility: OTHER | Age: 50
End: 2025-02-28
Attending: STUDENT IN AN ORGANIZED HEALTH CARE EDUCATION/TRAINING PROGRAM

## 2025-02-28 VITALS
HEIGHT: 68 IN | HEART RATE: 113 BPM | WEIGHT: 179.4 LBS | SYSTOLIC BLOOD PRESSURE: 160 MMHG | BODY MASS INDEX: 27.19 KG/M2 | TEMPERATURE: 97.6 F | DIASTOLIC BLOOD PRESSURE: 100 MMHG | OXYGEN SATURATION: 94 %

## 2025-02-28 DIAGNOSIS — F11.90 OPIOID USE DISORDER: Primary | ICD-10-CM

## 2025-02-28 DIAGNOSIS — I10 ESSENTIAL HYPERTENSION: ICD-10-CM

## 2025-02-28 DIAGNOSIS — F15.20 METHAMPHETAMINE DEPENDENCE (H): ICD-10-CM

## 2025-02-28 DIAGNOSIS — F17.210 TOBACCO DEPENDENCE DUE TO CIGARETTES: ICD-10-CM

## 2025-02-28 PROBLEM — Z87.442 HISTORY OF KIDNEY STONES: Status: ACTIVE | Noted: 2025-02-28

## 2025-02-28 LAB
AMPHETAMINES UR QL SCN: ABNORMAL
BARBITURATES UR QL SCN: ABNORMAL
BENZODIAZ UR QL SCN: ABNORMAL
BUPRENORPHINE UR QL: ABNORMAL
BZE UR QL SCN: ABNORMAL
CANNABINOIDS UR QL SCN: ABNORMAL
CREAT UR-MCNC: 168.6 MG/DL
ETHANOL UR QL SCN: NORMAL
FENTANYL UR QL: ABNORMAL
METHADONE UR QL SCN: NORMAL
OPIATES UR QL SCN: ABNORMAL
OXYCODONE UR QL: NORMAL
PCP QUAL URINE (ROCHE): ABNORMAL

## 2025-02-28 PROCEDURE — 80307 DRUG TEST PRSMV CHEM ANLYZR: CPT | Performed by: STUDENT IN AN ORGANIZED HEALTH CARE EDUCATION/TRAINING PROGRAM

## 2025-02-28 PROCEDURE — 80307 DRUG TEST PRSMV CHEM ANLYZR: CPT | Mod: 91 | Performed by: STUDENT IN AN ORGANIZED HEALTH CARE EDUCATION/TRAINING PROGRAM

## 2025-02-28 PROCEDURE — G2211 COMPLEX E/M VISIT ADD ON: HCPCS | Performed by: STUDENT IN AN ORGANIZED HEALTH CARE EDUCATION/TRAINING PROGRAM

## 2025-02-28 PROCEDURE — 99215 OFFICE O/P EST HI 40 MIN: CPT | Performed by: STUDENT IN AN ORGANIZED HEALTH CARE EDUCATION/TRAINING PROGRAM

## 2025-02-28 PROCEDURE — 82570 ASSAY OF URINE CREATININE: CPT | Mod: XU | Performed by: STUDENT IN AN ORGANIZED HEALTH CARE EDUCATION/TRAINING PROGRAM

## 2025-02-28 RX ORDER — BUPRENORPHINE HYDROCHLORIDE AND NALOXONE HYDROCHLORIDE DIHYDRATE 8; 2 MG/1; MG/1
1 TABLET SUBLINGUAL DAILY
Qty: 14 TABLET | Refills: 0 | Status: SHIPPED | OUTPATIENT
Start: 2025-02-28

## 2025-02-28 RX ORDER — LISINOPRIL AND HYDROCHLOROTHIAZIDE 10; 12.5 MG/1; MG/1
1 TABLET ORAL DAILY
Qty: 30 TABLET | Refills: 0 | Status: SHIPPED | OUTPATIENT
Start: 2025-02-28

## 2025-02-28 ASSESSMENT — ANXIETY QUESTIONNAIRES
7. FEELING AFRAID AS IF SOMETHING AWFUL MIGHT HAPPEN: NOT AT ALL
2. NOT BEING ABLE TO STOP OR CONTROL WORRYING: SEVERAL DAYS
4. TROUBLE RELAXING: SEVERAL DAYS
3. WORRYING TOO MUCH ABOUT DIFFERENT THINGS: SEVERAL DAYS
GAD7 TOTAL SCORE: 6
1. FEELING NERVOUS, ANXIOUS, OR ON EDGE: SEVERAL DAYS
8. IF YOU CHECKED OFF ANY PROBLEMS, HOW DIFFICULT HAVE THESE MADE IT FOR YOU TO DO YOUR WORK, TAKE CARE OF THINGS AT HOME, OR GET ALONG WITH OTHER PEOPLE?: SOMEWHAT DIFFICULT
6. BECOMING EASILY ANNOYED OR IRRITABLE: MORE THAN HALF THE DAYS
5. BEING SO RESTLESS THAT IT IS HARD TO SIT STILL: NOT AT ALL
IF YOU CHECKED OFF ANY PROBLEMS ON THIS QUESTIONNAIRE, HOW DIFFICULT HAVE THESE PROBLEMS MADE IT FOR YOU TO DO YOUR WORK, TAKE CARE OF THINGS AT HOME, OR GET ALONG WITH OTHER PEOPLE: SOMEWHAT DIFFICULT
7. FEELING AFRAID AS IF SOMETHING AWFUL MIGHT HAPPEN: NOT AT ALL
GAD7 TOTAL SCORE: 6
GAD7 TOTAL SCORE: 6

## 2025-02-28 ASSESSMENT — PAIN SCALES - GENERAL: PAINLEVEL_OUTOF10: NO PAIN (0)

## 2025-02-28 ASSESSMENT — PATIENT HEALTH QUESTIONNAIRE - PHQ9
SUM OF ALL RESPONSES TO PHQ QUESTIONS 1-9: 8
10. IF YOU CHECKED OFF ANY PROBLEMS, HOW DIFFICULT HAVE THESE PROBLEMS MADE IT FOR YOU TO DO YOUR WORK, TAKE CARE OF THINGS AT HOME, OR GET ALONG WITH OTHER PEOPLE: SOMEWHAT DIFFICULT
SUM OF ALL RESPONSES TO PHQ QUESTIONS 1-9: 8

## 2025-02-28 NOTE — LETTER
Brijesh Flynn  [id number]         February 28, 2025        Informed Consent for Treatment with Suboxone (Buprenorphine/Naloxone)    I understand that my provider is prescribing Suboxone (Buprenorphine/Naloxone) to assist in managing my opioid use disorder. This medication will be part of my overall recovery plan. Suboxone (Buprenorphine/Naloxone) is an FDA approved medication for treatment of people with opioid use disorder. Suboxone (Buprenorphine/Naloxone) comes in tablets or films that must be held under the tongue or between the gum and lip until dissolved completely as it will not be absorbed from the stomach if swallowed. Suboxone (Buprenorphine/Naloxone) can be used for detoxification or for maintenance therapy; or as long as medically necessary. Buprenorphine is a partial opioid; however, it does not activate the opioid receptors like heroin, fentanyl, or other full opioid agonists. It can result in physical dependence and should not be suddenly discontinued. Naloxone is a short-acting opioid blocker. If Suboxone (Buprenorphine/Naloxone) is dissolved and injected, smoked, or snorted, it may result in severe opioid withdrawal.  Some patients, if they suddenly discontinue Suboxone (Buprenorphine/Naloxone), may have opioid withdrawal symptoms. To minimize the possibility of opioid withdrawal, Suboxone (Buprenorphine/Naloxone) should be discontinued gradually, usually over several weeks and with provider assistance.     Individuals should be in mild to moderate withdrawal when started (induced) on Suboxone (Buprenorphine/Naloxone). If given too soon, it may cause precipitated withdrawal which could result in sudden and sometimes severe withdrawal symptoms. Ideally, the first dose should be under the guidance of a provider. Some patients may take several days to transition onto Suboxone (Buprenorphine/Naloxone). During that time, patients may have very mild withdrawal symptoms which can be minimized by other  medications prescribed by a provider. Once stabilized on Suboxone (Buprenorphine/Naloxone), other opioids will generally have much less effect. Attempts to override the Suboxone (Buprenorphine/Naloxone) by taking more opioids could result in an opioid overdose. Combining Suboxone (Buprenorphine/Naloxone) with alcohol, benzodiazepines, or other medications may also be hazardous and can result in respiratory depression or death.     Abstinence-based treatment is an option, however, data shows that medications for opioid use  disorder (MOUD) shows longer recovery and increased treatment retention and less relapse and death.    Another MOUD maintenance therapy is Methadone. Methadone is a full-agonist opioid which  binds to the receptor like heroin, fentanyl etc., but has a very long-half life so patients do not  get the same euphoria from it. Methadone for opioid use disorder can only be given in special  clinics called OTPs (historically called  methadone clinics ). If you would prefer Methadone, you  can be referred for those services.    Vivitrol, in the injectable long-acting formulation of naltrexone, is another MOUD. It completely  blocks the effects of opioids but has no opioid effects of its own. Vivitrol injections need to be  given every 4 weeks by a medical provider or clinic. You can receive this form of treatment in  our facility if requested.    The risks, benefits, and side effects of Suboxone (Buprenorphine/Naloxone) have been explained to me. I understand there are alternatives to Suboxone (Buprenorphine/Naloxone); however, currently, I am choosing to start Suboxone (Buprenorphine/Naloxone) as part of my treatment plan.      Suboxone (Buprenorphine/Naloxone) Treatment Agreement    This is an agreement between you and your provider about the safe and appropriate use of Suboxone (Buprenorphine/Naloxone) prescribed by your care team.    We are committed to collaborating with you in your efforts to get  better. To support you in this work, we will help you schedule regular office appointments for medicine refills. If we must cancel or change your appointment for any reason, we will make sure you have enough medicine to last until your next appointment.     As a Provider, I will:  Listen carefully to your concerns and treat you with respect.   Recommend a treatment plan that I believe is in your best interest. This plan may involve therapies other than Suboxone (Buprenorphine/Naloxone).   Talk with you often about the possible benefits, and the risk of harm of any medicine that we prescribe for you.   Provide a plan on how to taper (discontinue or go off) using this medicine if the decision is made to stop its use.    As a Patient, I understand that Suboxone (Buprenorphine/Naloxone):   Is a controlled substance prescribed by my care team to help me function or work and manage my condition(s).   Needs to be taken exactly as prescribed. Combining Suboxone (Buprenorphine/Naloxone) with certain medicines or chemicals (such as illegal drugs, sedatives, sleeping pills, and benzodiazepines) can be dangerous or even fatal. If I stop Suboxone (Buprenorphine/Naloxone) suddenly, I may have severe withdrawal symptoms.    The risks, benefits and side effects of these medicine(s) were explained to me. I agree that:  I will take part in other treatments as advised by my care team. This may be psychiatry or counseling, physical therapy, behavioral therapy, group treatment or a referral to a specialist.     I will keep all my appointments. I understand that this is part of the monitoring of Suboxone (Buprenorphine/Naloxone). My care team may require an office visit for EVERY refill. If I miss appointments or do not follow instructions, my care team may stop my medicine.    I will be respectful and considerate to all staff and providers at the clinic. Rude or aggressive behavior to staff including providers, nursing staff, front  desk, and any others will not be tolerated.    I will be honest with my care team.     I will take my medicines as prescribed. I will not change the dose or schedule unless my care team tells me to. There will be no refills if I run out early.     I may be asked to come to the clinic and complete a urine drug test or complete a film/pill count at any time. If I do not give a urine sample or participate in a film/pill count, my care team may stop my medicine.    It is up to me to make sure that I do not run out of my medicines on weekends or holidays. If my care team is willing to refill my Suboxone (Buprenorphine/Naloxone) prescription without a visit, I must request refills only during office hours. Refills may take up to three business days to process. I will use one pharmacy to fill all my Suboxone (Buprenorphine/Naloxone) and other controlled substance prescriptions. I will notify the clinic about any changes to my insurance or medication availability.    I am responsible for my prescriptions. If the medicine/prescription is lost, stolen, or destroyed, it will not be replaced. I will store my medication in a secure location away from children. I also agree not to share controlled substance medicines with anyone.    I am aware I should not use any illegal or recreational drugs. I agree not to drink alcohol unless my care team says I can.     I will tell my care team right away if I become pregnant, have a new medical problem treated outside of my regular clinic, or have a change in my medications.    I understand that this medicine can affect my thinking, judgment, and reaction time. Alcohol and drugs affect the brain and body, which can affect the safety of my driving. Being under the influence of alcohol or drugs can affect my decision-making, behaviors, personal safety, and the safety of others. Driving while impaired (DWI) can occur if a person is driving, operating, or in physical control of a car,  motorcycle, boat, snowmobile, ATV, motorbike, off-road vehicle, or any other motor vehicle ([place] Statute 169[place].20). I understand the risk if I choose to drive or operate any vehicle or machinery.    I understand that if I do not follow any of the conditions above, my prescriptions or treatment may be stopped or changed.    I agree that my provider, clinic care team, and pharmacy may work with any city, state or federal law enforcement agency that investigates the misuse, sale, or other diversion of my controlled medicine. I will allow my provider to discuss my care with, or share a copy of, this agreement with any other treating provider, pharmacy, or emergency room where I receive care.    This agreement will remain in effect for duration of therapy and updated as appropriate, and/or  annually.    I have read this agreement and have asked questions about anything I did not understand.    __________________________________        ____________________________________  Patient     Date          Rancho Ornelas MD                     Date                            Electronically signed

## 2025-02-28 NOTE — LETTER
Brijesh Flynn  [id number]         February 28, 2025        Informed Consent for Treatment with Suboxone (Buprenorphine/Naloxone)    I understand that my provider is prescribing Suboxone (Buprenorphine/Naloxone) to assist in managing my opioid use disorder. This medication will be part of my overall recovery plan. Suboxone (Buprenorphine/Naloxone) is an FDA approved medication for treatment of people with opioid use disorder. Suboxone (Buprenorphine/Naloxone) comes in tablets or films that must be held under the tongue or between the gum and lip until dissolved completely as it will not be absorbed from the stomach if swallowed. Suboxone (Buprenorphine/Naloxone) can be used for detoxification or for maintenance therapy; or as long as medically necessary. Buprenorphine is a partial opioid; however, it does not activate the opioid receptors like heroin, fentanyl, or other full opioid agonists. It can result in physical dependence and should not be suddenly discontinued. Naloxone is a short-acting opioid blocker. If Suboxone (Buprenorphine/Naloxone) is dissolved and injected, smoked, or snorted, it may result in severe opioid withdrawal.  Some patients, if they suddenly discontinue Suboxone (Buprenorphine/Naloxone), may have opioid withdrawal symptoms. To minimize the possibility of opioid withdrawal, Suboxone (Buprenorphine/Naloxone) should be discontinued gradually, usually over several weeks and with provider assistance.     Individuals should be in mild to moderate withdrawal when started (induced) on Suboxone (Buprenorphine/Naloxone). If given too soon, it may cause precipitated withdrawal which could result in sudden and sometimes severe withdrawal symptoms. Ideally, the first dose should be under the guidance of a provider. Some patients may take several days to transition onto Suboxone (Buprenorphine/Naloxone). During that time, patients may have very mild withdrawal symptoms which can be minimized by other  medications prescribed by a provider. Once stabilized on Suboxone (Buprenorphine/Naloxone), other opioids will generally have much less effect. Attempts to override the Suboxone (Buprenorphine/Naloxone) by taking more opioids could result in an opioid overdose. Combining Suboxone (Buprenorphine/Naloxone) with alcohol, benzodiazepines, or other medications may also be hazardous and can result in respiratory depression or death.     Abstinence-based treatment is an option, however, data shows that medications for opioid use  disorder (MOUD) shows longer recovery and increased treatment retention and less relapse and death.    Another MOUD maintenance therapy is Methadone. Methadone is a full-agonist opioid which  binds to the receptor like heroin, fentanyl etc., but has a very long-half life so patients do not  get the same euphoria from it. Methadone for opioid use disorder can only be given in special  clinics called OTPs (historically called  methadone clinics ). If you would prefer Methadone, you  can be referred for those services.    Vivitrol, in the injectable long-acting formulation of naltrexone, is another MOUD. It completely  blocks the effects of opioids but has no opioid effects of its own. Vivitrol injections need to be  given every 4 weeks by a medical provider or clinic. You can receive this form of treatment in  our facility if requested.    The risks, benefits, and side effects of Suboxone (Buprenorphine/Naloxone) have been explained to me. I understand there are alternatives to Suboxone (Buprenorphine/Naloxone); however, currently, I am choosing to start Suboxone (Buprenorphine/Naloxone) as part of my treatment plan.      Suboxone (Buprenorphine/Naloxone) Treatment Agreement    This is an agreement between you and your provider about the safe and appropriate use of Suboxone (Buprenorphine/Naloxone) prescribed by your care team.    We are committed to collaborating with you in your efforts to get  better. To support you in this work, we will help you schedule regular office appointments for medicine refills. If we must cancel or change your appointment for any reason, we will make sure you have enough medicine to last until your next appointment.     As a Provider, I will:  Listen carefully to your concerns and treat you with respect.   Recommend a treatment plan that I believe is in your best interest. This plan may involve therapies other than Suboxone (Buprenorphine/Naloxone).   Talk with you often about the possible benefits, and the risk of harm of any medicine that we prescribe for you.   Provide a plan on how to taper (discontinue or go off) using this medicine if the decision is made to stop its use.    As a Patient, I understand that Suboxone (Buprenorphine/Naloxone):   Is a controlled substance prescribed by my care team to help me function or work and manage my condition(s).   Needs to be taken exactly as prescribed. Combining Suboxone (Buprenorphine/Naloxone) with certain medicines or chemicals (such as illegal drugs, sedatives, sleeping pills, and benzodiazepines) can be dangerous or even fatal. If I stop Suboxone (Buprenorphine/Naloxone) suddenly, I may have severe withdrawal symptoms.    The risks, benefits and side effects of these medicine(s) were explained to me. I agree that:  I will take part in other treatments as advised by my care team. This may be psychiatry or counseling, physical therapy, behavioral therapy, group treatment or a referral to a specialist.     I will keep all my appointments. I understand that this is part of the monitoring of Suboxone (Buprenorphine/Naloxone). My care team may require an office visit for EVERY refill. If I miss appointments or do not follow instructions, my care team may stop my medicine.    I will be respectful and considerate to all staff and providers at the clinic. Rude or aggressive behavior to staff including providers, nursing staff, front  desk, and any others will not be tolerated.    I will be honest with my care team.     I will take my medicines as prescribed. I will not change the dose or schedule unless my care team tells me to. There will be no refills if I run out early.     I may be asked to come to the clinic and complete a urine drug test or complete a film/pill count at any time. If I do not give a urine sample or participate in a film/pill count, my care team may stop my medicine.    It is up to me to make sure that I do not run out of my medicines on weekends or holidays. If my care team is willing to refill my Suboxone (Buprenorphine/Naloxone) prescription without a visit, I must request refills only during office hours. Refills may take up to three business days to process. I will use one pharmacy to fill all my Suboxone (Buprenorphine/Naloxone) and other controlled substance prescriptions. I will notify the clinic about any changes to my insurance or medication availability.    I am responsible for my prescriptions. If the medicine/prescription is lost, stolen, or destroyed, it will not be replaced. I will store my medication in a secure location away from children. I also agree not to share controlled substance medicines with anyone.    I am aware I should not use any illegal or recreational drugs. I agree not to drink alcohol unless my care team says I can.     I will tell my care team right away if I become pregnant, have a new medical problem treated outside of my regular clinic, or have a change in my medications.    I understand that this medicine can affect my thinking, judgment, and reaction time. Alcohol and drugs affect the brain and body, which can affect the safety of my driving. Being under the influence of alcohol or drugs can affect my decision-making, behaviors, personal safety, and the safety of others. Driving while impaired (DWI) can occur if a person is driving, operating, or in physical control of a car,  motorcycle, boat, snowmobile, ATV, motorbike, off-road vehicle, or any other motor vehicle ([place] Statute 169[place].20). I understand the risk if I choose to drive or operate any vehicle or machinery.    I understand that if I do not follow any of the conditions above, my prescriptions or treatment may be stopped or changed.    I agree that my provider, clinic care team, and pharmacy may work with any city, state or federal law enforcement agency that investigates the misuse, sale, or other diversion of my controlled medicine. I will allow my provider to discuss my care with, or share a copy of, this agreement with any other treating provider, pharmacy, or emergency room where I receive care.    This agreement will remain in effect for duration of therapy and updated as appropriate, and/or  annually.    I have read this agreement and have asked questions about anything I did not understand.    __________________________________        ____________________________________  Patient     Date          Rancho Ornelas MD                     Date

## 2025-03-13 ENCOUNTER — PATIENT OUTREACH (OUTPATIENT)
Dept: CARE COORDINATION | Facility: OTHER | Age: 50
End: 2025-03-13

## 2025-03-13 NOTE — PROGRESS NOTES
"Clinic Care Coordination Contact  Care Team Conversations    RN CC sent Brijesh a text message this date reminding him of his appointment tomorrow and to arrive at 1:45PM.  He replied, \"got it thank you\".    Mayda Coto RN-BSN, [facility] Care Coordinator  [phone]      "

## 2025-03-14 ENCOUNTER — APPOINTMENT (OUTPATIENT)
Dept: LAB | Facility: OTHER | Age: 50
End: 2025-03-14
Attending: STUDENT IN AN ORGANIZED HEALTH CARE EDUCATION/TRAINING PROGRAM

## 2025-03-27 ENCOUNTER — PATIENT OUTREACH (OUTPATIENT)
Dept: CARE COORDINATION | Facility: OTHER | Age: 50
End: 2025-03-27

## 2025-03-27 NOTE — PROGRESS NOTES
Clinic Care Coordination Contact  Care Team Conversations    RN CC sent Brijesh a text message this date reminding him of his appointment tomorrow and to arrive at 1:15PM.    Mayda Coto RN-BSN, [facility] Care Coordinator  [phone]

## 2025-03-28 ENCOUNTER — APPOINTMENT (OUTPATIENT)
Dept: LAB | Facility: OTHER | Age: 50
End: 2025-03-28
Attending: STUDENT IN AN ORGANIZED HEALTH CARE EDUCATION/TRAINING PROGRAM

## 2025-04-18 NOTE — PROGRESS NOTES
Assessment & Plan     Opioid use disorder  Remains in recovery from opioid use.  Benefiting from stable Suboxone dosing.  No side effects.  Unfortunately job fell through but otherwise doing really well.  Continue current Suboxone regimen for harm reduction.  - Visit in conjunction with RN CC; appreciate assistance  - MAT contract signed 2/28/2025  - PDMP reviewed  - Urine Drug Screen Buprenorphine Urine Qualitative [id number], Ethanol Urine Qualitative [id number], Methadone Urine Qualitative [id number], Oxycodone Urine Qualitative [id number], Creatinine Urine Random HRE568  - buprenorphine-naloxone (SUBOXONE) 8-2 MG SUBL sublingual tablet; Place 1 tablet under the tongue daily.  - Follow-up 5/14/2025    Methamphetamine dependence (H)  Work in progress.  Still gradually self weaning, but using near daily.  Have had many discussions about short and long-term risks of illicit substance use.  Continue to support as able.    Essential hypertension  Up a bit today but ran here after running late, has been well-controlled since starting Zestoretic.  BP will benefit from quitting meth.  - lisinopril-hydrochlorothiazide (ZESTORETIC) 10-12.5 MG tablet; Take 1 tablet by mouth daily.    I spent a total of 31 minutes on the day of the visit.   Time spent by me today doing chart review, history and exam, documentation and further activities per the note    The longitudinal plan of care for the diagnosis(es)/condition(s) as documented were addressed during this visit. Due to the added complexity in care, I will continue to support Brijesh Flynn in the subsequent management and with ongoing continuity of care.    Follow-up about 3 weeks.    Maria Del Rosario Coley is a 50 year old, presenting for the following health issues:  Recheck Medication        4/22/2025     4:13 PM   Additional Questions   Roomed by Leslie CLEMENS   Accompanied by self     HPI      He is currently taking 8/2 mg of buprenorphine daily.   Last fill 3.28.25 #25.    Status  Since Last Visit:  Have you used any opioids since your last visit?: no use since last visit  Do you feel that your dose of suboxone is too high or too low? Adequate  Have there been cravings for opioids? No   Any withdrawal symptoms?  None     Any side effects from the medication?  None  Any alcohol use? None  Any other recreational drug use? Methamphetamine - very little compared to his past - last use  yesterday evening -snorted it.  Use is still daily if he has some    Precipitating Factors:  Triggers have been: mild stress,boredom - ended up not getting the job - feel through for him - getting sick of being at home   Other Supports:  Do you attend counseling or meet with a therapist? No  Do you attend NA or AA meetings? No  Do you have/meet with a sponsor? No  Family and support systems have been: Helpful  What other goals have you been working on (job, family, relationships, etc)?   Attending scheduled appointments  Communicating effectively with health care team  Currently without a job - stressful for him   Has some interviews coming up, which he is hopeful about  BP elevated a little bit today - but was running late to his appointment - ran in to the clinic and was brought right back to office  Feels good on current meds           11/28/2016     1:52 PM 2/28/2025    12:54 PM   PHQ-9 SCORE   PHQ-9 Total Score MyChart  8 (Mild depression)   PHQ-9 Total Score 9 8        Patient-reported           2/4/2025    11:53 AM 2/28/2025     1:00 PM   ROSENDA-7 SCORE   Total Score  6 (mild anxiety)   Total Score 0 6        Patient-reported     PDMP Review         Value Time User    State PDMP site checked  Yes 3/14/2025  2:23 PM Rancho Ornelas MD            Review of Systems  Constitutional, HEENT, cardiovascular, pulmonary, gi and gu systems are negative, except as otherwise noted.      Objective    BP (!) 136/92 (BP Location: Left arm, Patient Position: Sitting, Cuff Size: Adult Regular)   Pulse 95   Temp 97  F (36.1  " LISA) (Tympanic)   Ht 1.727 m (5' 8\")   Wt 80.7 kg (178 lb)   SpO2 98%   BMI 27.06 kg/m    Body mass index is 27.06 kg/m .  Physical Exam  Vitals reviewed.   Constitutional:       General: He is not in acute distress.     Appearance: He is not toxic-appearing.   Musculoskeletal:         General: Normal range of motion.   Skin:     General: Skin is warm and dry.   Neurological:      General: No focal deficit present.      Mental Status: He is alert and oriented to person, place, and time.   Psychiatric:         Mood and Affect: Mood normal.         Behavior: Behavior normal.         Results for orders placed or performed in visit on 04/22/25 (from the past 24 hours)   Urine Drug Screen Buprenorphine Urine Qualitative [id number], Ethanol Urine Qualitative SHN329, Methadone Urine Qualitative [id number], Oxycodone Urine Qualitative HIJ0088, Creatinine Urine Random [id number]    Narrative    The following orders were created for panel order Urine Drug Screen Buprenorphine Urine Qualitative [id number], Ethanol Urine Qualitative XPT263, Methadone Urine Qualitative MIH2374, Oxycodone Urine Qualitative [id number], Creatinine Urine Random [id number].  Procedure                               Abnormality         Status                     ---------                               -----------         ------                     Urine Drug Screen Panel[[id number]]                         In process                 Buprenorphine Urine, Qu...[[id number]]                      In process                 Ethanol urine[[id number]]                                   In process                 Methadone Qual Urine[[id number]]                            In process                 Oxycodone Urine, Qualit...[[id number]]                      In process                 Creatinine random urine[[id number]]                         In process                   Please view results for these tests on the individual orders.           Signed Electronically by: Rancho " MD Ceci

## 2025-04-21 ENCOUNTER — PATIENT OUTREACH (OUTPATIENT)
Dept: CARE COORDINATION | Facility: OTHER | Age: 50
End: 2025-04-21

## 2025-04-21 NOTE — PROGRESS NOTES
Clinic Care Coordination Contact  Care Team Conversations    RN CC sent Brijesh a text message this date reminding him of his appointment tomorrow and to arrive at 4:00PM.    Mayda Coto RN-BSN, [facility] Care Coordinator  [phone]

## 2025-04-22 ENCOUNTER — APPOINTMENT (OUTPATIENT)
Dept: LAB | Facility: OTHER | Age: 50
End: 2025-04-22
Attending: STUDENT IN AN ORGANIZED HEALTH CARE EDUCATION/TRAINING PROGRAM

## 2025-04-22 ENCOUNTER — PATIENT OUTREACH (OUTPATIENT)
Dept: CARE COORDINATION | Facility: OTHER | Age: 50
End: 2025-04-22

## 2025-04-22 ENCOUNTER — OFFICE VISIT (OUTPATIENT)
Dept: FAMILY MEDICINE | Facility: OTHER | Age: 50
End: 2025-04-22
Attending: STUDENT IN AN ORGANIZED HEALTH CARE EDUCATION/TRAINING PROGRAM

## 2025-04-22 VITALS
HEART RATE: 95 BPM | BODY MASS INDEX: 26.98 KG/M2 | TEMPERATURE: 97 F | DIASTOLIC BLOOD PRESSURE: 92 MMHG | HEIGHT: 68 IN | WEIGHT: 178 LBS | SYSTOLIC BLOOD PRESSURE: 136 MMHG | OXYGEN SATURATION: 98 %

## 2025-04-22 DIAGNOSIS — I10 ESSENTIAL HYPERTENSION: ICD-10-CM

## 2025-04-22 DIAGNOSIS — F11.90 OPIOID USE DISORDER: Primary | ICD-10-CM

## 2025-04-22 DIAGNOSIS — F15.20 METHAMPHETAMINE DEPENDENCE (H): ICD-10-CM

## 2025-04-22 LAB
AMPHETAMINES UR QL SCN: ABNORMAL
BARBITURATES UR QL SCN: ABNORMAL
BENZODIAZ UR QL SCN: ABNORMAL
BUPRENORPHINE UR QL: ABNORMAL
BZE UR QL SCN: ABNORMAL
CANNABINOIDS UR QL SCN: ABNORMAL
CREAT UR-MCNC: 115.8 MG/DL
ETHANOL UR QL SCN: NORMAL
FENTANYL UR QL: ABNORMAL
METHADONE UR QL SCN: NORMAL
OPIATES UR QL SCN: ABNORMAL
OXYCODONE UR QL: NORMAL
PCP QUAL URINE (ROCHE): ABNORMAL

## 2025-04-22 RX ORDER — BUPRENORPHINE HYDROCHLORIDE AND NALOXONE HYDROCHLORIDE DIHYDRATE 8; 2 MG/1; MG/1
1 TABLET SUBLINGUAL DAILY
Qty: 25 TABLET | Refills: 0 | Status: SHIPPED | OUTPATIENT
Start: 2025-04-22

## 2025-04-22 RX ORDER — LISINOPRIL AND HYDROCHLOROTHIAZIDE 10; 12.5 MG/1; MG/1
1 TABLET ORAL DAILY
Qty: 30 TABLET | Refills: 0 | Status: SHIPPED | OUTPATIENT
Start: 2025-04-22

## 2025-04-22 ASSESSMENT — PAIN SCALES - GENERAL: PAINLEVEL_OUTOF10: NO PAIN (0)

## 2025-04-22 NOTE — PROGRESS NOTES
Clinic Care Coordination Contact  Follow Up Progress Note      Assessment: RN CC met with Brijesh prior to him seeing Dr. Ornelas this date.   Brijesh comes in for an MOUD follow up appointment.   Has been taking Suboxone as prescribed - 8/2mg daily.  No adverse effects.  No cravings or desire to use opioids.  Ongoing meth use - daily - snorts it.  His goal after last visit was to quit meth use all together due to starting a new job.  That job fell through - he did make it 3 days without using, but he has returned to daily use now.  Still motivated to stop all together and is working at this.  He does state that he got pretty uncomfortable on that third day without use.  Did encourage him to reach out to RN CC if/when he is ready to try again - as Dr. Ornelas can send in additional comfort meds for him to take during the washout period.  Reason for use at this time is simply habit and boredom - he is sick of not working.  Encouraged him to keep applying at places - and suggested he put in applications through temp agencies, such as [employer].  He was agreeable with this.      Care Gaps:    Health Maintenance Due   Topic Date Due    YEARLY PREVENTIVE VISIT  Never done    COLORECTAL CANCER SCREENING  Never done    Pneumococcal Vaccine: Pediatrics (0 to 5 Years) and At-Risk Patients (6 to 49 Years) (1 of 2 - PCV) Never done    HEPATITIS A IMMUNIZATION (1 of 2 - Risk 2-dose series) Never done    HEPATITIS B IMMUNIZATION (1 of 3 - 19+ 3-dose series) Never done    LIPID  Never done    INFLUENZA VACCINE (1) 09/01/2024    COVID-19 Vaccine (2 - 2024-25 season) 09/01/2024       Will continue to work on these    Care Plans  Care Plan: Substance Use       Problem: Substance Use       Goal: Improve substance use status       This Visit's Progress: 50% Recent Progress: 60%    Note:     Barriers: insurance issues  Strengths: honesty, expresses desire to change  Patient expressed understanding of goal: Yes  Action steps to  achieve this goal:  1. I will take my medications as prescribed  2. I will attend scheduled office visits  3. I will reach out if I am struggling                                 Intervention/Education provided during outreach: Stage of Change: Preparatory and Action  Reviewed information and resources for treatment and ongoing sobriety  Patient assessed present costs and future losses as a result of substance use  Reviewed concerns related to health related substance abuse risk  Provided encouragement towards sobriety  Provided support and affirmation for steps taken towards sobriety   Consulted with PCP about current impact on health    Facilitated understanding the importance of awareness of factors that contribute to relapse , Assisted patient in identifying personal vulnerabilities, thoughts, emotions, and situations that may lead to relapse , Coached on skills to manage factors that contribute to relapse, and Facilitated understanding of effective coping skills in response to triggers for substance use    Was there a referral placed last visit that needs follow up?     Yes - Health-related social needs - Completed - he did call [facility]; however, could not get through to a live person.  His GF is helping with this now, as he is currently unemployed.         Continue current meds as prescribed.       Outreach Frequency: monthly, more frequently as needed        Plan:   No change in treatment plan at this time.    Care Coordinator will follow up in 3 weeks, sooner if he has concerns.    Mayda Coto RN-BSN, [facility] Care Coordinator  [phone]

## 2025-05-08 NOTE — PROGRESS NOTES
Assessment & Plan     Opioid use disorder  Suboxone stable.  New job, unfortunately dealing with some pending housing insecurity if unable to pay off balance in the next 90 days; discussed support resources.  Trying hard to stay on track.  Continue current Suboxone regimen for harm reduction.  - Visit in conjunction with RN CC; appreciate assistance  - MAT contract signed 2/28/2025  - PDMP reviewed  - Urine Drug Screen Buprenorphine Urine Qualitative [id number], Ethanol Urine Qualitative XKN920, Methadone Urine Qualitative CIP8372, Oxycodone Urine Qualitative BBY8433, Creatinine Urine Random [id number]  - buprenorphine-naloxone (SUBOXONE) 8-2 MG SUBL sublingual tablet; Place 1 tablet under the tongue daily.  - buprenorphine-naloxone (SUBOXONE) 8-2 MG SUBL sublingual tablet; Place 1 tablet under the tongue daily.  - Follow-up 6/18/2025    Methamphetamine dependence (H)  Not much interval change, may be slightly less quantity but still daily use.  Aware of risks.  - Urine Drug Screen Buprenorphine Urine Qualitative [id number], Ethanol Urine Qualitative CLT398, Methadone Urine Qualitative OTN4245, Oxycodone Urine Qualitative WOC2698, Creatinine Urine Random [id number]    Essential hypertension  Mildly elevated again in clinic although again comes in from stressful situation.  Continues to report that BP well-controlled with home monitoring.  Will have him bring BP cuff for calibration at follow-up.  Also reviewed BP goals and will reach out sooner if home readings elevated.  Discussed doubling Zestoretic dose if needed.  Continue to work on cutting back on stimulant use, healthy diet and exercise habits.  - Zestoretic 10-12.5 mg daily    I spent a total of 32 minutes on the day of the visit.   Time spent by me today doing chart review, history and exam, documentation and further activities per the note    The longitudinal plan of care for the diagnosis(es)/condition(s) as documented were addressed during this visit. Due to the  added complexity in care, I will continue to support Brijesh Flynn in the subsequent management and with ongoing continuity of care.    Follow-up about 1 month.    Subjective   Brijesh is a 50 year old, presenting for the following health issues:  Recheck Medication        5/14/2025     4:16 PM   Additional Questions   Roomed by Aleksandr Pruett   Accompanied by None         5/14/2025     4:16 PM   Patient Reported Additional Medications   Patient reports taking the following new medications None     HPI      Meth is the same; less quantity but same frequency  Bp at goal at home  Will bring cuff at follow up  Suboxone going well    He is currently taking 8/2 mg of buprenorphine daily.   Last fill 4.22.25 #25.    Status Since Last Visit:  Have you used any opioids since your last visit?: no use since last visit  Do you feel that your dose of suboxone is too high or too low? Adequate  Have there been cravings for opioids? No   Any withdrawal symptoms? None    Any side effects from the medication? None  Any alcohol use? None  Any other recreational drug use? Ongoing methamphetamine use - amount has decreased - making supply last longer than usual    Precipitating Factors:  Triggers have been: mild stress in life - 90 days to evacuate his property   Other Supports:  Do you attend counseling or meet with a therapist? No  Do you attend NA or AA meetings? No  Do you have/meet with a sponsor? No  Family and support systems have been: Helpful   What other goals have you been working on (job, family, relationships, etc)? Starting working again at [employer]  Trying to  overtime as he is able to  Still without insurance- hoping to get this through his employer    PDMP Review         Value Time User    State PDMP site checked  Yes 4/22/2025  4:39 PM Rancho Ornelas MD          Review of Systems  Constitutional, HEENT, cardiovascular, pulmonary, gi and gu systems are negative, except as otherwise noted.      Objective    BP  (!) 147/95 (BP Location: Left arm, Patient Position: Sitting, Cuff Size: Adult Large)   Pulse 98   Temp 97.5  F (36.4  C) (Tympanic)   Resp 14   Wt 77.7 kg (171 lb 4.8 oz)   SpO2 98%   BMI 26.05 kg/m    Body mass index is 26.05 kg/m .  Physical Exam  Vitals reviewed.   Constitutional:       Appearance: Normal appearance.   HENT:      Head: Normocephalic and atraumatic.   Musculoskeletal:         General: Normal range of motion.   Skin:     General: Skin is warm and dry.   Neurological:      General: No focal deficit present.      Mental Status: He is alert and oriented to person, place, and time.   Psychiatric:         Mood and Affect: Mood normal.         Behavior: Behavior normal.            Results for orders placed or performed in visit on 05/14/25 (from the past 24 hours)   Urine Drug Screen Buprenorphine Urine Qualitative [id number], Ethanol Urine Qualitative LHW730, Methadone Urine Qualitative HOS0990, Oxycodone Urine Qualitative RJH1682, Creatinine Urine Random BUW191    Narrative    The following orders were created for panel order Urine Drug Screen Buprenorphine Urine Qualitative [id number], Ethanol Urine Qualitative JVJ998, Methadone Urine Qualitative QTR4561, Oxycodone Urine Qualitative WEA4327, Creatinine Urine Random ZMG141.  Procedure                               Abnormality         Status                     ---------                               -----------         ------                     Urine Drug Screen Panel[[id number]]                         In process                 Buprenorphine Urine, Qu...[[id number]]                      In process                 Ethanol urine[[id number]]                                   In process                 Methadone Qual Urine[[id number]]                            In process                 Oxycodone Urine, Qualit...[[id number]]                      In process                 Creatinine random urine[[id number]]                         In process                    Please view results for these tests on the individual orders.           Signed Electronically by: Rancho Ornelas MD

## 2025-05-13 ENCOUNTER — PATIENT OUTREACH (OUTPATIENT)
Dept: CARE COORDINATION | Facility: OTHER | Age: 50
End: 2025-05-13

## 2025-05-13 NOTE — PROGRESS NOTES
Clinic Care Coordination Contact  Care Team Conversations    RN CC sent Brijesh a text message this date reminding him of his appointment tomorrow and to arrive at 4:00PM.    Mayda Coto RN-BSN, [facility] Care Coordinator  [phone]

## 2025-05-14 ENCOUNTER — OFFICE VISIT (OUTPATIENT)
Dept: FAMILY MEDICINE | Facility: OTHER | Age: 50
End: 2025-05-14
Attending: STUDENT IN AN ORGANIZED HEALTH CARE EDUCATION/TRAINING PROGRAM

## 2025-05-14 ENCOUNTER — PATIENT OUTREACH (OUTPATIENT)
Dept: CARE COORDINATION | Facility: OTHER | Age: 50
End: 2025-05-14

## 2025-05-14 ENCOUNTER — APPOINTMENT (OUTPATIENT)
Dept: LAB | Facility: OTHER | Age: 50
End: 2025-05-14
Attending: STUDENT IN AN ORGANIZED HEALTH CARE EDUCATION/TRAINING PROGRAM

## 2025-05-14 ENCOUNTER — RESULTS FOLLOW-UP (OUTPATIENT)
Dept: FAMILY MEDICINE | Facility: OTHER | Age: 50
End: 2025-05-14

## 2025-05-14 VITALS
DIASTOLIC BLOOD PRESSURE: 95 MMHG | BODY MASS INDEX: 26.05 KG/M2 | SYSTOLIC BLOOD PRESSURE: 147 MMHG | HEART RATE: 98 BPM | WEIGHT: 171.3 LBS | TEMPERATURE: 97.5 F | OXYGEN SATURATION: 98 % | RESPIRATION RATE: 14 BRPM

## 2025-05-14 DIAGNOSIS — I10 ESSENTIAL HYPERTENSION: ICD-10-CM

## 2025-05-14 DIAGNOSIS — F15.20 METHAMPHETAMINE DEPENDENCE (H): ICD-10-CM

## 2025-05-14 DIAGNOSIS — F11.90 OPIOID USE DISORDER: Primary | ICD-10-CM

## 2025-05-14 LAB
AMPHETAMINES UR QL SCN: ABNORMAL
BARBITURATES UR QL SCN: ABNORMAL
BENZODIAZ UR QL SCN: ABNORMAL
BUPRENORPHINE UR QL: ABNORMAL
BZE UR QL SCN: ABNORMAL
CANNABINOIDS UR QL SCN: ABNORMAL
CREAT UR-MCNC: 313.7 MG/DL
ETHANOL UR QL SCN: NORMAL
FENTANYL UR QL: ABNORMAL
METHADONE UR QL SCN: NORMAL
OPIATES UR QL SCN: ABNORMAL
OXYCODONE UR QL: NORMAL
PCP QUAL URINE (ROCHE): ABNORMAL

## 2025-05-14 RX ORDER — BUPRENORPHINE HYDROCHLORIDE AND NALOXONE HYDROCHLORIDE DIHYDRATE 8; 2 MG/1; MG/1
1 TABLET SUBLINGUAL DAILY
Qty: 8 TABLET | Refills: 0 | Status: SHIPPED | OUTPATIENT
Start: 2025-06-11

## 2025-05-14 RX ORDER — BUPRENORPHINE HYDROCHLORIDE AND NALOXONE HYDROCHLORIDE DIHYDRATE 8; 2 MG/1; MG/1
1 TABLET SUBLINGUAL DAILY
Qty: 28 TABLET | Refills: 0 | Status: SHIPPED | OUTPATIENT
Start: 2025-05-14

## 2025-05-14 ASSESSMENT — PAIN SCALES - GENERAL: PAINLEVEL_OUTOF10: NO PAIN (0)

## 2025-05-14 NOTE — PROGRESS NOTES
"Clinic Care Coordination Contact  Follow Up Progress Note      Assessment: RN CC attended office visit with Brijesh and Dr. Ornelas this date.  Brijesh comes in today for an MOUD follow up appointment.  Has been taking Suboxone 8/2mg daily with no adverse effects.  No opioid use since initiating MOUD - feels better overall while taking medication.  He started working full time at [employer] in [place], which he is thankful for.  He is working overtime as he is able to.  He did receive bad news recently - he was notified that he has 90 days to evacuate his current property due to missed payments.  He has lived in that house for 8 years and is [place] for [employer].  Over the winter - he did experience financial strain -  had to choose between heat or his house payment.  Empathy provided to him over this situation.  He did call [address] and is working with them to see if he qualifies for any assistance programs - commended him on this.  BP continues to run a little bit high - Brijesh states that he bought a home BP monitor and his readings at home have been \"within the green on the chart that came with the machine\".  Will see if he can send in a few readings once he is back at home, as he did not bring the machine with him today.  Will remind him to bring home BP machine with to next appointment to make sure that it is calibrated correctly.  Methamphetamine use continues to be ongoing - amount is less per his report - making his supply last longer than he has in the past.  Discussed ongoing meth use and its effect on overall health, especially BP.  He is aware.  He is motivated to keep working on meth cessation.  We are here to support in any way that we can.          Care Gaps:    Health Maintenance Due   Topic Date Due    YEARLY PREVENTIVE VISIT  Never done    COLORECTAL CANCER SCREENING  Never done    Pneumococcal Vaccine: Pediatrics (0 to 5 Years) and At-Risk Patients (6 to 49 Years) (1 of 2 - PCV) Never done    HEPATITIS A " "IMMUNIZATION (1 of 2 - Risk 2-dose series) Never done    HEPATITIS B IMMUNIZATION (1 of 3 - 19+ 3-dose series) Never done    LIPID  Never done    COVID-19 Vaccine (2 - 2024-25 season) 09/01/2024       Will continue to work on these - currently without active health insurance-  he states he is working on this    Care Plans  Care Plan: Substance Use       Problem: Substance Use       Goal: Improve substance use status       This Visit's Progress: 50% Recent Progress: 60%    Note:     Barriers: insurance issues  Strengths: honesty, expresses desire to change  Patient expressed understanding of goal: Yes  Action steps to achieve this goal:  1. I will take my medications as prescribed  2. I will attend scheduled office visits  3. I will reach out if I am struggling                                 Intervention/Education provided during outreach: Stage of Change: Preparatory  Reviewed information and resources for treatment and ongoing sobriety    Facilitated understanding the importance of awareness of factors that contribute to relapse , Assisted patient in identifying personal vulnerabilities, thoughts, emotions, and situations that may lead to relapse , Coached on skills to manage factors that contribute to relapse, Facilitated understanding of effective coping skills in response to triggers for substance use, and Discussed the use of substances and its impact on their relationships      Still without active health insurance - he is \"getting no where with the county\".  States that [employer] offers health insurance - he is going to talk with HR - as he is hoping to just get health insurance through [employer].  Encouraged him to continue to work with [employer] in regards to his current living situation.  Instructed him to monitor BP at home - send RN CC your last few home readings.  Bring your home BP machine with to next visit - RN CC will remind you.  Continue to work on decreasing your meth use.  Reach out if you are interested in " trying some off label medications to help with stimulant use disorder.       Outreach Frequency: monthly, more frequently as needed        Plan:   No change in MOUD treatment plan at this time.   Care Coordinator will follow up in 5 weeks, sooner if he has concerns.    Mayda Coto RN-BSN, [facility] Care Coordinator  [phone]

## 2025-06-10 ENCOUNTER — PATIENT OUTREACH (OUTPATIENT)
Dept: CARE COORDINATION | Facility: OTHER | Age: 50
End: 2025-06-10

## 2025-06-10 NOTE — PROGRESS NOTES
Clinic Care Coordination Contact  Care Team Conversations    RN CC sent Brijesh a text message this date reminding him that there is a refill on file for his Suboxone at the pharmacy - and he can fill this tomorrow.   Did suggest he call the pharmacy in the morning and request that the RX be filled.    Mayda Coto, RN-BSN, [facility] Care Coordinator  [phone]

## 2025-06-17 ENCOUNTER — PATIENT OUTREACH (OUTPATIENT)
Dept: CARE COORDINATION | Facility: OTHER | Age: 50
End: 2025-06-17

## 2025-06-17 NOTE — PROGRESS NOTES
Clinic Care Coordination Contact  Care Team Conversations    RN CC sent Brijesh a text message this date reminding him of his appointment tomorrow and to arrive at 4:00PM.  Also requested that he bring his home BP machine with to this appointment.    Mayda Coto RN-BSN, [facility] Care Coordinator  [phone]

## 2025-06-18 ENCOUNTER — PATIENT OUTREACH (OUTPATIENT)
Dept: CARE COORDINATION | Facility: OTHER | Age: 50
End: 2025-06-18

## 2025-06-18 NOTE — PROGRESS NOTES
Clinic Care Coordination Contact  Care Team Conversations    RN CC received text from Brijesh this date at 4:13PM stating that he will be unable to make it to his appointment on time today.  His had to bring his wife down to [place] - she is in the hospital.  He apologized for the late notice.  RN CC did thank him for updating care team.  Did reschedule him for Monday, June 23, arrive at 4:00PM.  Brijesh was appreciative of this.    Mayda Coto RN-BSN, [facility] Care Coordinator  [phone]

## 2025-06-20 NOTE — PROGRESS NOTES
Assessment & Plan     Opioid use disorder  Suboxone stable, no interval use.  Adequate dosing, no side effects.  Continue current Suboxone regimen.  - Visit in conjunction with RN CC; appreciate assistance  - MAT contract signed 2/28/2025  - PDMP reviewed  - Urine Drug Screen Buprenorphine Urine Qualitative [id number], Ethanol Urine Qualitative [id number], Methadone Urine Qualitative [id number], Oxycodone Urine Qualitative [id number], Creatinine Urine Random [id number]  - buprenorphine-naloxone (SUBOXONE) 8-2 MG SUBL sublingual tablet; Place 1 tablet under the tongue daily.  - Follow-up 7/21/2025    Methamphetamine dependence (H)  Slight decrease use since last visit, not quite daily anymore.  May have more to do with availability rather than choice but either way using less.    Essential hypertension  Improved since starting Zestoretic, but persistently elevated.  Did try to calibrate home cuff today, question reliability/accuracy.  Overall feeling better since starting BP med.  Slight dose increase today and reassess at follow-up in 1 month.  Discussed impact of illicit stimulant use on hypertension.  - lisinopril-hydrochlorothiazide (ZESTORETIC) 20-12.5 MG tablet; Take 1 tablet by mouth daily.  - BMP at follow-up    I spent a total of 30 minutes on the day of the visit.   Time spent by me today doing chart review, history and exam, documentation and further activities per the note    The longitudinal plan of care for the diagnosis(es)/condition(s) as documented were addressed during this visit. Due to the added complexity in care, I will continue to support Brijesh Flynn in the subsequent management and with ongoing continuity of care.    Follow-up 1 month.    Maria Del Rosario Coley is a 50 year old, presenting for the following health issues:  Recheck Medication        6/23/2025     4:11 PM   Additional Questions   Roomed by Maryjane Coulter   Accompanied by None         6/23/2025     4:11 PM   Patient Reported Additional  Medications   Patient reports taking the following new medications None     HPI        He is currently taking 8/2 mg of buprenorphine daily.   Last fill 6.16.25 #8.     Status Since Last Visit:  Have you used any opioids since your last visit?: no use since last visit  Do you feel that your dose of suboxone is too high or too low? Adequate  Have there been cravings for opioids? No   Any withdrawal symptoms? None    Any side effects from the medication? None  Any alcohol use? None  Any other recreational drug use? Methamphetamine - last use was 2 days ago    Precipitating Factors:  Triggers have been: mild life stressors-  SO was recently sick and was admitted to hospital   Other Supports:  Do you attend counseling or meet with a therapist? No  Do you attend NA or AA meetings? No  Do you have/meet with a sponsor? No  Family and support systems have been: Helpful   What other goals have you been working on (job, family, relationships, etc)? Taking medications as prescribed  Attending appointments  Communicating with health care team  BP elevated today - did bring in home machine this date - 156/110 - unsure if home readings are accurate         PDMP Review         Value Time User    State PDMP site checked  Yes 4/22/2025  4:39 PM Rancho Ornelas MD            Review of Systems  Constitutional, HEENT, cardiovascular, pulmonary, gi and gu systems are negative, except as otherwise noted.      Objective    BP (!) 156/110 (BP Location: Left arm, Patient Position: Sitting, Cuff Size: Adult Regular)   Pulse 99   Temp 98  F (36.7  C) (Tympanic)   Resp 11   Wt 74.9 kg (165 lb 3.2 oz)   SpO2 96%   BMI 25.12 kg/m    Body mass index is 25.12 kg/m .  Physical Exam  Vitals reviewed.   Constitutional:       Appearance: Normal appearance.   Musculoskeletal:         General: Normal range of motion.   Skin:     General: Skin is warm and dry.   Neurological:      General: No focal deficit present.      Mental Status: He is alert  and oriented to person, place, and time.   Psychiatric:         Mood and Affect: Mood normal.         Behavior: Behavior normal.         Results for orders placed or performed in visit on 06/23/25 (from the past 24 hours)   Urine Drug Screen Buprenorphine Urine Qualitative [id number], Ethanol Urine Qualitative EEW994, Methadone Urine Qualitative TVH1183, Oxycodone Urine Qualitative [id number], Creatinine Urine Random [id number]    Narrative    The following orders were created for panel order Urine Drug Screen Buprenorphine Urine Qualitative [id number], Ethanol Urine Qualitative [id number], Methadone Urine Qualitative [id number], Oxycodone Urine Qualitative [id number], Creatinine Urine Random [id number].  Procedure                               Abnormality         Status                     ---------                               -----------         ------                     Urine Drug Screen Panel[[id number]]     Abnormal            Final result               Buprenorphine Urine, Qu...[[id number]]  Abnormal            Final result               Ethanol urine[[id number]]               Normal              Final result               Methadone Qual Urine[[id number]]        Normal              Final result               Oxycodone Urine, Qualit...[[id number]]  Normal              Final result               Creatinine random urine[[id number]]                         Final result                 Please view results for these tests on the individual orders.   Urine Drug Screen Panel   Result Value Ref Range    Amphetamines Urine Screen Positive (A) Screen Negative    Barbituates Urine Screen Negative Screen Negative    Benzodiazepine Urine Screen Negative Screen Negative    Cannabinoids Urine Screen Negative Screen Negative    Cocaine Urine Screen Negative Screen Negative    Fentanyl Qual Urine Screen Negative Screen Negative    Opiates Urine Screen Negative Screen Negative    PCP Urine Screen Negative Screen Negative   Buprenorphine Urine,  Qualitative   Result Value Ref Range    Buprenorphine Qual Urine Screen Positive (A) Screen Negative   Ethanol urine   Result Value Ref Range    Ethanol Urine Screen Negative Screen Negative   Methadone Qual Urine   Result Value Ref Range    Methadone Urine Screen Negative Screen Negative   Oxycodone Urine, Qualitative   Result Value Ref Range    Oxycodone Urine Screen Negative Screen Negative   Creatinine random urine   Result Value Ref Range    Creatinine Urine mg/dL 141.3 mg/dL           Signed Electronically by: Rancho Ornelas MD

## 2025-06-23 ENCOUNTER — OFFICE VISIT (OUTPATIENT)
Dept: FAMILY MEDICINE | Facility: OTHER | Age: 50
End: 2025-06-23
Attending: STUDENT IN AN ORGANIZED HEALTH CARE EDUCATION/TRAINING PROGRAM

## 2025-06-23 ENCOUNTER — PATIENT OUTREACH (OUTPATIENT)
Dept: CARE COORDINATION | Facility: OTHER | Age: 50
End: 2025-06-23

## 2025-06-23 ENCOUNTER — APPOINTMENT (OUTPATIENT)
Dept: LAB | Facility: OTHER | Age: 50
End: 2025-06-23
Attending: STUDENT IN AN ORGANIZED HEALTH CARE EDUCATION/TRAINING PROGRAM

## 2025-06-23 VITALS
DIASTOLIC BLOOD PRESSURE: 110 MMHG | WEIGHT: 165.2 LBS | RESPIRATION RATE: 11 BRPM | SYSTOLIC BLOOD PRESSURE: 156 MMHG | OXYGEN SATURATION: 96 % | HEART RATE: 99 BPM | TEMPERATURE: 98 F | BODY MASS INDEX: 25.12 KG/M2

## 2025-06-23 DIAGNOSIS — I10 ESSENTIAL HYPERTENSION: ICD-10-CM

## 2025-06-23 DIAGNOSIS — F11.90 OPIOID USE DISORDER: Primary | ICD-10-CM

## 2025-06-23 DIAGNOSIS — F15.20 METHAMPHETAMINE DEPENDENCE (H): ICD-10-CM

## 2025-06-23 LAB
AMPHETAMINES UR QL SCN: ABNORMAL
BARBITURATES UR QL SCN: ABNORMAL
BENZODIAZ UR QL SCN: ABNORMAL
BUPRENORPHINE UR QL: ABNORMAL
BZE UR QL SCN: ABNORMAL
CANNABINOIDS UR QL SCN: ABNORMAL
CREAT UR-MCNC: 141.3 MG/DL
ETHANOL UR QL SCN: NORMAL
FENTANYL UR QL: ABNORMAL
METHADONE UR QL SCN: NORMAL
OPIATES UR QL SCN: ABNORMAL
OXYCODONE UR QL: NORMAL
PCP QUAL URINE (ROCHE): ABNORMAL

## 2025-06-23 PROCEDURE — 80307 DRUG TEST PRSMV CHEM ANLYZR: CPT | Mod: 91 | Performed by: STUDENT IN AN ORGANIZED HEALTH CARE EDUCATION/TRAINING PROGRAM

## 2025-06-23 PROCEDURE — G2211 COMPLEX E/M VISIT ADD ON: HCPCS | Performed by: STUDENT IN AN ORGANIZED HEALTH CARE EDUCATION/TRAINING PROGRAM

## 2025-06-23 PROCEDURE — 82570 ASSAY OF URINE CREATININE: CPT | Mod: XU | Performed by: STUDENT IN AN ORGANIZED HEALTH CARE EDUCATION/TRAINING PROGRAM

## 2025-06-23 PROCEDURE — 80307 DRUG TEST PRSMV CHEM ANLYZR: CPT | Performed by: STUDENT IN AN ORGANIZED HEALTH CARE EDUCATION/TRAINING PROGRAM

## 2025-06-23 PROCEDURE — 99214 OFFICE O/P EST MOD 30 MIN: CPT | Performed by: STUDENT IN AN ORGANIZED HEALTH CARE EDUCATION/TRAINING PROGRAM

## 2025-06-23 RX ORDER — LISINOPRIL AND HYDROCHLOROTHIAZIDE 12.5; 2 MG/1; MG/1
1 TABLET ORAL DAILY
Qty: 30 TABLET | Refills: 0 | Status: SHIPPED | OUTPATIENT
Start: 2025-06-23

## 2025-06-23 RX ORDER — LISINOPRIL AND HYDROCHLOROTHIAZIDE 10; 12.5 MG/1; MG/1
1 TABLET ORAL DAILY
Qty: 30 TABLET | Refills: 0 | Status: CANCELLED | OUTPATIENT
Start: 2025-06-23

## 2025-06-23 RX ORDER — BUPRENORPHINE HYDROCHLORIDE AND NALOXONE HYDROCHLORIDE DIHYDRATE 8; 2 MG/1; MG/1
1 TABLET SUBLINGUAL DAILY
Qty: 28 TABLET | Refills: 0 | Status: SHIPPED | OUTPATIENT
Start: 2025-06-23

## 2025-06-23 ASSESSMENT — PAIN SCALES - GENERAL: PAINLEVEL_OUTOF10: NO PAIN (0)

## 2025-06-23 NOTE — PROGRESS NOTES
Clinic Care Coordination Contact  Follow Up Progress Note      Assessment: RN CC attended office visit with Brijesh and Dr. Ornelas this date.  Brijesh comes in today for an MOUD follow up appointment.  Has been stable on MOUD for almost 4 months now - taking Suboxone 8/2mg daily with  no adverse effects or return to opioid use.  Would like to stay on current dose.  Meth use is ongoing - smoking this.  States he continues to work on cessation - not by choice necessarily - cost and availability.  Working full time at [employer] in [place], [place].   BP continues to be elevated - has been out of BP med X 2 days - and meth use continues to be ongoing - so hard to get baseline BP.  Did bring in his home BP machine to visit this date - unsure of accuracy of home BP machine, as the reading on his machine during visit was 156/110 on his left arm.        Care Gaps:    Health Maintenance Due   Topic Date Due    YEARLY PREVENTIVE VISIT  Never done    COLORECTAL CANCER SCREENING  Never done    PNEUMOCOCCAL VACCINE: PEDIATRICS (0 to 5 YEARS) AND AT-RISK PATIENTS (6 to 49 YEARS) (1 of 2 - PCV) Never done    HEPATITIS A VACCINE (1 of 2 - Risk 2-dose series) Never done    HEPATITIS B VACCINE (1 of 3 - 19+ 3-dose series) Never done    LIPID  Never done    COVID-19 VACCINE (2 - 2024-25 season) 09/01/2024       Will continue to work on these    Care Plans  Care Plan: Substance Use       Problem: Substance Use       Goal: Improve substance use status       This Visit's Progress: 60% Recent Progress: 50%    Note:     Barriers: insurance issues  Strengths: honesty, expresses desire to change  Patient expressed understanding of goal: Yes  Action steps to achieve this goal:  1. I will take my medications as prescribed  2. I will attend scheduled office visits  3. I will reach out if I am struggling                                 Intervention/Education provided during outreach: Stage of Change: Preparatory  Reviewed information and resources for  treatment and ongoing sobriety  Patient assessed present costs and future losses as a result of substance use  Reviewed concerns related to health related substance abuse risk  Provided encouragement towards sobriety    Would individual benefit from a referral for additional services/resources?    Yes - Health-related social needs - [facility] - provided him with [facility] [facility] Application this date and went over the needed documents for proof of income verification   Brijesh also stated that he signed paperwork at the  when he checked in today - for assistance with applying for [employer]      Continue current medications as prescribed.  Encouraged him to continue to work on meth cessation - try to identify triggers and develop a return to use prevention plan - what else can you do instead of using meth at that time (go for a walk, read, journal, etc).       Outreach Frequency: monthly, more frequently as needed      Plan:   No change in MOUD treatment plan at this time.    Care Coordinator will follow up in 4 weeks, sooner if he has concerns.    Mayda Coto RN-BSN, [facility] Care Coordinator  [phone]

## 2025-06-24 ENCOUNTER — RESULTS FOLLOW-UP (OUTPATIENT)
Dept: FAMILY MEDICINE | Facility: OTHER | Age: 50
End: 2025-06-24

## 2025-07-13 ENCOUNTER — HEALTH MAINTENANCE LETTER (OUTPATIENT)
Age: 50
End: 2025-07-13

## 2025-07-18 NOTE — PROGRESS NOTES
{PROVIDER CHARTING PREFERENCE:824342}    Maria Del Rosario Coley is a 50 year old, presenting for the following health issues:  Recheck Medication    HPI      {SUPERLIST (Optional):289961}  Hypertension Follow-up    Do you check your blood pressure regularly outside of the clinic? Yes   Are you following a low salt diet? No  Are your blood pressures ever more than 140 on the top number (systolic) OR more   than 90 on the bottom number (diastolic), for example 140/90? He does not recall its in his notebook   No side effects  Taking zestoretic  Feeling better lately    BP Readings from Last 2 Encounters:   07/21/25 (!) 140/92   06/23/25 (!) 156/110       Opioid Use Disorder Follow-up  He is currently taking 8/2 mg of buprenorphine daily.   Last fill 6.23.25 #28    Status Since Last Visit:  Have you used any opioids since your last visit?: patient has relapsed  Do you feel that your dose of suboxone is too high or too low? Adequate  Have there been cravings for opioids? No   Any withdrawal symptoms? None    Any side effects from the medication? None  Any alcohol use? None  Any other recreational drug use? Meth    Discussed increasing by 4mg to help quit meth totally    Precipitating Factors:  Triggers have been: non-existent   Other Supports:  Do you attend counseling or meet with a therapist? No  Do you attend NA or AA meetings? No  Do you have/meet with a sponsor? No  Family and support systems have been: Helpful   What other goals have you been working on (job, family, relationships, etc)? Attending scheduled appointments  Communicating effectively with health care team  Taking medication as prescribed     Power still cut, living on a generator  Working cutting back on meth  Meth down to once a day (mornings)  Just had it drugs in general              11/28/2016     1:52 PM 2/28/2025    12:54 PM 7/21/2025     4:36 PM   PHQ-9 SCORE   PHQ-9 Total Score MyChart  8 (Mild depression)    PHQ-9 Total Score 9  8  7        "Patient-reported    Data saved with a previous flowsheet row definition           2/4/2025    11:53 AM 2/28/2025     1:00 PM 7/21/2025     4:36 PM   ROSENDA-7 SCORE   Total Score  6 (mild anxiety)    Total Score 0 6  5       Patient-reported     PDMP Review         Value Time User    State PDMP site checked  Yes 4/22/2025  4:39 PM Rancho Ornelas MD                  Review of Systems  Constitutional, HEENT, cardiovascular, pulmonary, gi and gu systems are negative, except as otherwise noted.      Objective    BP (!) 140/92 (BP Location: Left arm, Patient Position: Sitting, Cuff Size: Adult Large)   Pulse 99   Temp 97.5  F (36.4  C) (Tympanic)   Resp 16   Ht 1.727 m (5' 8\")   Wt 75.3 kg (166 lb)   SpO2 98%   BMI 25.24 kg/m    Body mass index is 25.24 kg/m .  Physical Exam  Vitals reviewed.   Constitutional:       Appearance: Normal appearance.   HENT:      Head: Normocephalic and atraumatic.   Cardiovascular:      Rate and Rhythm: Normal rate and regular rhythm.      Heart sounds: No murmur heard.  Pulmonary:      Effort: Pulmonary effort is normal.      Breath sounds: Normal breath sounds. No stridor. No wheezing, rhonchi or rales.   Musculoskeletal:         General: Normal range of motion.   Skin:     General: Skin is warm and dry.   Neurological:      General: No focal deficit present.      Mental Status: He is alert and oriented to person, place, and time.   Psychiatric:         Mood and Affect: Mood normal.         Behavior: Behavior normal.          Recent Results (from the past 24 hours)   Urine Drug Screen Buprenorphine Urine Qualitative [id number], Ethanol Urine Qualitative TEE502, Methadone Urine Qualitative BFJ9002, Oxycodone Urine Qualitative [id number], Creatinine Urine Random [id number]    Narrative    The following orders were created for panel order Urine Drug Screen Buprenorphine Urine Qualitative [id number], Ethanol Urine Qualitative VLY878, Methadone Urine Qualitative [id number], Oxycodone Urine Qualitative " [id number], Creatinine Urine Random [id number].  Procedure                               Abnormality         Status                     ---------                               -----------         ------                     Urine Drug Screen Panel[[id number]]                         In process                 Buprenorphine Urine, Qu...[[id number]]                      In process                 Ethanol urine[[id number]]                                   In process                 Methadone Qual Urine[[id number]]                            In process                 Oxycodone Urine, Qualit...[[id number]]                      In process                 Creatinine random urine[[id number]]                         In process                   Please view results for these tests on the individual orders.           Signed Electronically by: Rancho Ornelas MD  {Email feedback regarding this note to [email or web address]   :[id number]}   [id number], Creatinine Urine Random YNL580.  Procedure                               Abnormality         Status                     ---------                               -----------         ------                     Urine Drug Screen Panel[[id number]]                         In process                 Buprenorphine Urine, Qu...[[id number]]                      In process                 Ethanol urine[[id number]]                                   In process                 Methadone Qual Urine[[id number]]                            In process                 Oxycodone Urine, Qualit...[[id number]]                      In process                 Creatinine random urine[[id number]]                         In process                   Please view results for these tests on the individual orders.           Signed Electronically by: Rancho Ornelas MD

## 2025-07-21 ENCOUNTER — PATIENT OUTREACH (OUTPATIENT)
Dept: CARE COORDINATION | Facility: OTHER | Age: 50
End: 2025-07-21

## 2025-07-21 ENCOUNTER — OFFICE VISIT (OUTPATIENT)
Dept: FAMILY MEDICINE | Facility: OTHER | Age: 50
End: 2025-07-21
Attending: STUDENT IN AN ORGANIZED HEALTH CARE EDUCATION/TRAINING PROGRAM

## 2025-07-21 ENCOUNTER — APPOINTMENT (OUTPATIENT)
Dept: LAB | Facility: OTHER | Age: 50
End: 2025-07-21
Attending: STUDENT IN AN ORGANIZED HEALTH CARE EDUCATION/TRAINING PROGRAM

## 2025-07-21 VITALS
TEMPERATURE: 97.5 F | RESPIRATION RATE: 16 BRPM | OXYGEN SATURATION: 98 % | SYSTOLIC BLOOD PRESSURE: 140 MMHG | WEIGHT: 166 LBS | DIASTOLIC BLOOD PRESSURE: 92 MMHG | HEIGHT: 68 IN | BODY MASS INDEX: 25.16 KG/M2 | HEART RATE: 99 BPM

## 2025-07-21 DIAGNOSIS — F15.20 METHAMPHETAMINE DEPENDENCE (H): ICD-10-CM

## 2025-07-21 DIAGNOSIS — F11.90 OPIOID USE DISORDER: Primary | ICD-10-CM

## 2025-07-21 DIAGNOSIS — I10 ESSENTIAL HYPERTENSION: ICD-10-CM

## 2025-07-21 LAB
AMPHETAMINES UR QL SCN: ABNORMAL
BARBITURATES UR QL SCN: ABNORMAL
BENZODIAZ UR QL SCN: ABNORMAL
BUPRENORPHINE UR QL: ABNORMAL
BZE UR QL SCN: ABNORMAL
CANNABINOIDS UR QL SCN: ABNORMAL
CREAT UR-MCNC: 262.3 MG/DL
ETHANOL UR QL SCN: NORMAL
FENTANYL UR QL: ABNORMAL
METHADONE UR QL SCN: NORMAL
OPIATES UR QL SCN: ABNORMAL
OXYCODONE UR QL: NORMAL
PCP QUAL URINE (ROCHE): ABNORMAL

## 2025-07-21 PROCEDURE — 80307 DRUG TEST PRSMV CHEM ANLYZR: CPT | Mod: XU | Performed by: STUDENT IN AN ORGANIZED HEALTH CARE EDUCATION/TRAINING PROGRAM

## 2025-07-21 PROCEDURE — 82570 ASSAY OF URINE CREATININE: CPT | Mod: XU | Performed by: STUDENT IN AN ORGANIZED HEALTH CARE EDUCATION/TRAINING PROGRAM

## 2025-07-21 PROCEDURE — 80307 DRUG TEST PRSMV CHEM ANLYZR: CPT | Performed by: STUDENT IN AN ORGANIZED HEALTH CARE EDUCATION/TRAINING PROGRAM

## 2025-07-21 PROCEDURE — 99214 OFFICE O/P EST MOD 30 MIN: CPT | Performed by: STUDENT IN AN ORGANIZED HEALTH CARE EDUCATION/TRAINING PROGRAM

## 2025-07-21 PROCEDURE — G2211 COMPLEX E/M VISIT ADD ON: HCPCS | Performed by: STUDENT IN AN ORGANIZED HEALTH CARE EDUCATION/TRAINING PROGRAM

## 2025-07-21 RX ORDER — BUPRENORPHINE HYDROCHLORIDE AND NALOXONE HYDROCHLORIDE DIHYDRATE 8; 2 MG/1; MG/1
1 TABLET SUBLINGUAL DAILY
Qty: 28 TABLET | Refills: 0 | Status: SHIPPED | OUTPATIENT
Start: 2025-07-21

## 2025-07-21 RX ORDER — LISINOPRIL AND HYDROCHLOROTHIAZIDE 12.5; 2 MG/1; MG/1
1 TABLET ORAL DAILY
Qty: 30 TABLET | Refills: 0 | Status: SHIPPED | OUTPATIENT
Start: 2025-07-21

## 2025-07-21 ASSESSMENT — PATIENT HEALTH QUESTIONNAIRE - PHQ9
SUM OF ALL RESPONSES TO PHQ QUESTIONS 1-9: 7
5. POOR APPETITE OR OVEREATING: NOT AT ALL

## 2025-07-21 ASSESSMENT — PAIN SCALES - GENERAL: PAINLEVEL_OUTOF10: NO PAIN (0)

## 2025-07-21 ASSESSMENT — ANXIETY QUESTIONNAIRES
6. BECOMING EASILY ANNOYED OR IRRITABLE: NOT AT ALL
5. BEING SO RESTLESS THAT IT IS HARD TO SIT STILL: NOT AT ALL
GAD7 TOTAL SCORE: 5
7. FEELING AFRAID AS IF SOMETHING AWFUL MIGHT HAPPEN: NOT AT ALL
GAD7 TOTAL SCORE: 5
2. NOT BEING ABLE TO STOP OR CONTROL WORRYING: MORE THAN HALF THE DAYS
1. FEELING NERVOUS, ANXIOUS, OR ON EDGE: SEVERAL DAYS
3. WORRYING TOO MUCH ABOUT DIFFERENT THINGS: MORE THAN HALF THE DAYS

## 2025-07-21 NOTE — PROGRESS NOTES
[facility] Care Coordination Contact  Care Team Conversations    RN CC unable to attend office visit with Brijesh and Dr. Ornelas this date.  RN CC did schedule 4 week follow up MOUD appointment.  Brijesh aware to reach out to RN CC with any questions/concerns.    Mayda Coto RN-BSN, [facility] Care Coordinator  [phone]

## 2025-08-18 ENCOUNTER — OFFICE VISIT (OUTPATIENT)
Dept: FAMILY MEDICINE | Facility: OTHER | Age: 50
End: 2025-08-18
Attending: STUDENT IN AN ORGANIZED HEALTH CARE EDUCATION/TRAINING PROGRAM

## 2025-08-18 ENCOUNTER — APPOINTMENT (OUTPATIENT)
Dept: LAB | Facility: OTHER | Age: 50
End: 2025-08-18

## 2025-08-18 ENCOUNTER — PATIENT OUTREACH (OUTPATIENT)
Dept: CARE COORDINATION | Facility: OTHER | Age: 50
End: 2025-08-18

## 2025-08-18 VITALS
WEIGHT: 165 LBS | HEART RATE: 84 BPM | RESPIRATION RATE: 18 BRPM | DIASTOLIC BLOOD PRESSURE: 88 MMHG | OXYGEN SATURATION: 100 % | TEMPERATURE: 97 F | SYSTOLIC BLOOD PRESSURE: 138 MMHG | BODY MASS INDEX: 25.09 KG/M2

## 2025-08-18 DIAGNOSIS — F15.20 METHAMPHETAMINE DEPENDENCE (H): ICD-10-CM

## 2025-08-18 DIAGNOSIS — I10 ESSENTIAL HYPERTENSION: ICD-10-CM

## 2025-08-18 DIAGNOSIS — F11.90 OPIOID USE DISORDER: Primary | ICD-10-CM

## 2025-08-18 LAB
AMPHETAMINES UR QL SCN: ABNORMAL
BARBITURATES UR QL SCN: ABNORMAL
BENZODIAZ UR QL SCN: ABNORMAL
BUPRENORPHINE UR QL: ABNORMAL
BZE UR QL SCN: ABNORMAL
CANNABINOIDS UR QL SCN: ABNORMAL
CREAT UR-MCNC: 118.3 MG/DL
ETHANOL UR QL SCN: NORMAL
FENTANYL UR QL: ABNORMAL
METHADONE UR QL SCN: NORMAL
OPIATES UR QL SCN: ABNORMAL
OXYCODONE UR QL: NORMAL
PCP QUAL URINE (ROCHE): ABNORMAL

## 2025-08-18 RX ORDER — LISINOPRIL AND HYDROCHLOROTHIAZIDE 12.5; 2 MG/1; MG/1
1 TABLET ORAL DAILY
Qty: 30 TABLET | Refills: 0 | Status: SHIPPED | OUTPATIENT
Start: 2025-08-18

## 2025-08-18 RX ORDER — BUPRENORPHINE HYDROCHLORIDE AND NALOXONE HYDROCHLORIDE DIHYDRATE 8; 2 MG/1; MG/1
1 TABLET SUBLINGUAL DAILY
Qty: 28 TABLET | Refills: 0 | Status: SHIPPED | OUTPATIENT
Start: 2025-08-18

## (undated) RX ORDER — CEFTRIAXONE SODIUM 1 G
VIAL (EA) INJECTION
Status: DISPENSED
Start: 2025-02-04

## (undated) RX ORDER — CEFTRIAXONE 500 MG/1
INJECTION, POWDER, FOR SOLUTION INTRAMUSCULAR; INTRAVENOUS
Status: DISPENSED
Start: 2025-02-04

## (undated) RX ORDER — LIDOCAINE HYDROCHLORIDE 10 MG/ML
INJECTION, SOLUTION EPIDURAL; INFILTRATION; INTRACAUDAL; PERINEURAL
Status: DISPENSED
Start: 2025-02-04